# Patient Record
Sex: FEMALE | Race: BLACK OR AFRICAN AMERICAN | NOT HISPANIC OR LATINO | Employment: UNEMPLOYED | URBAN - METROPOLITAN AREA
[De-identification: names, ages, dates, MRNs, and addresses within clinical notes are randomized per-mention and may not be internally consistent; named-entity substitution may affect disease eponyms.]

---

## 2017-01-20 ENCOUNTER — OFFICE VISIT (OUTPATIENT)
Dept: PAIN MEDICINE | Facility: CLINIC | Age: 47
End: 2017-01-20

## 2017-01-20 VITALS
WEIGHT: 293 LBS | TEMPERATURE: 98.3 F | DIASTOLIC BLOOD PRESSURE: 83 MMHG | HEIGHT: 64 IN | OXYGEN SATURATION: 95 % | HEART RATE: 90 BPM | RESPIRATION RATE: 16 BRPM | BODY MASS INDEX: 50.02 KG/M2 | SYSTOLIC BLOOD PRESSURE: 138 MMHG

## 2017-01-20 DIAGNOSIS — M25.50 MULTIPLE JOINT PAIN: ICD-10-CM

## 2017-01-20 DIAGNOSIS — M48.061 LUMBAR SPINAL STENOSIS: Primary | ICD-10-CM

## 2017-01-20 PROCEDURE — 99213 OFFICE O/P EST LOW 20 MIN: CPT | Performed by: PAIN MEDICINE

## 2017-01-20 RX ORDER — HYDROCODONE BITARTRATE AND ACETAMINOPHEN 7.5; 325 MG/1; MG/1
1 TABLET ORAL 2 TIMES DAILY PRN
Qty: 60 TABLET | Refills: 0 | Status: SHIPPED | OUTPATIENT
Start: 2017-01-20 | End: 2017-02-20 | Stop reason: SDUPTHER

## 2017-01-20 NOTE — PROGRESS NOTES
CHIEF COMPLAINT: Back Pain; Extremity Pain; and Pain (right hip)    HPI  Angie Perera is a 46 y.o. female.  She is here to follow up for Back Pain; Extremity Pain; and Pain (right hip)    Since last visit their pain has remain unchanged. Multiple areas of pain, worse is right hip and lateral aspect of RLE just below right knee.  Pt c/o being constipated a lot but thinks it is due to eating less due to gastric bypass surgery she got last year. She said she is going at least three times a week now which is an improvement.     The patient states their pain is a 7 on a scale of 1-10.  The patient describes this pain as constant ache, stabbing and burning in R hip area and R leg. The pain is located in Low Back and radiates into right hip, lateral aspect of R leg to just below R knee. This painful problem is aggravated by lifting and standing,walking,sitting > 45 minutes and is alleviated by relaxation, pain medication, ice and heating pad. Intermittent numbness over right anterior thigh.   She said she stopped going to her chiropractor because he was not helping her and was costing too much. States her chiro told her there was nothing he could do.     On miralax nightly, fleet enema 2x/week, stool softener 3 days ago.  Was constipated before restarting narcotic medication and doesn't think that is the cause.   Has been constipated since 9/2016 when she had gastric bypass surgery.     Was evaluated by back surgeon approx 2 years ago at [facility]. Stated low back surgery was an option but wanted her to go through pain management first and she has been with [facility] ever since. She is hesitant to have back surgery. [facility], Dr. Duran, discharged from clinic for abnormal UDS with MJ, last visit 9/1/2016.    Past pain medications: hydrocodone 7.5/325 mg - tid - helping but became ineffective  Hydrocodone 10/325 mg tid- last dose was 9/2016  Prednisone dose pack- significant  help  Morphine ER 30 mg bid - made her sick and itching- stopped after her Bariatric surgery.   Tylenol q8h prn     Current pain medications:   Norco 5/325 mg bid prn pain   Gabapentin 800 mg qid - helping  Flexeril 10 mg tid - no help with pain but helps her sleep     Past therapies:  Physical Therapy: yes- currently going to water therapy three times/week.   Chiropractor: yes  Massage Therapy: no  TENS: yes  Neck or back surgery: no      Previous Injections: yes, total of 7 LESIs, most recently at [facility] in 2011  Effect of Injection (%): minimal help  Length of Relief: couple weeks     PEG Assessment   What number best describes your pain on average in the past week? 7  What number best describes how, during the past week, pain has interfered with your enjoyment of life? 7  What number best describes how, during the past week, pain has interfered with your general activity? 8      Current Outpatient Prescriptions:   •  allopurinol (ZYLOPRIM) 300 MG tablet, Take 300 mg by mouth every morning., Disp: , Rfl:   •  Calcium Carbonate-Vit D-Min (CALCIUM 1200 PO), Take 1,200 mg by mouth 2 (two) times a day., Disp: , Rfl:   •  Cholecalciferol (VITAMIN D3) 5000 UNITS capsule capsule, Take 5,000 Units by mouth Daily., Disp: , Rfl:   •  cyclobenzaprine (FLEXERIL) 10 MG tablet, Take 1 tablet by mouth 3 (Three) Times a Day As Needed for muscle spasms., Disp: 90 tablet, Rfl: 1  •  furosemide (LASIX) 40 MG tablet, Take 40 mg by mouth every morning., Disp: , Rfl:   •  gabapentin (NEURONTIN) 800 MG tablet, Take 1 tablet by mouth 4 (Four) Times a Day., Disp: 120 tablet, Rfl: 1  •  HYDROcodone-acetaminophen (NORCO) 5-325 MG per tablet, Take 1 tablet by mouth 2 (Two) Times a Day As Needed (pain)., Disp: 60 tablet, Rfl: 0  •  ursodiol (ACTIGALL) 300 MG capsule, Take 1 capsule by mouth 2 (two) times a day., Disp: 60 capsule, Rfl: 5  •  HYDROcodone-acetaminophen (NORCO) 7.5-325 MG per tablet, Take 1 tablet by mouth 2 (Two) Times  a Day As Needed (pain)., Disp: 60 tablet, Rfl: 0    IMAGING  lumbar MRI 11/26/2014:  Impression:  1. At L4/5, there is spondylolyses and listhesis which is mildly increased from the prior exam. Broad-based protruding disc is also increased from the prior exam and there is a dorsal right paracentral epidural fluid collection as described above that is probably represent a synovial or ligamentum flavum cyst. The combination of finding results in relatively severe spinal stenosis which is increased from the prior exam as well as severe left and moderately severe right foraminal stenosis and impingement of the L4 nerve root bilaterally.  2. At L5-S1 there is a 2-3 mm central and right paracentral disc bulge which appears stable.  This lumbar MRI report was completed at [facility] and will be scanned into her permanent record.    The following portions of the patient's history were reviewed and updated as appropriate: allergies, current medications, past family history, past medical history, past social history, past surgical history and problem list.    Review of Systems   Constitutional: Positive for appetite change (decreased). Negative for activity change, chills and fever.   HENT: Negative for ear discharge and ear pain.    Eyes: Negative for pain.   Respiratory: Negative for apnea, cough, shortness of breath and wheezing.    Cardiovascular: Negative for chest pain and palpitations.   Gastrointestinal: Positive for constipation. Negative for abdominal pain, diarrhea, nausea and vomiting.   Genitourinary: Negative for difficulty urinating.   Musculoskeletal: Positive for back pain.   Neurological: Positive for numbness. Negative for dizziness, weakness, light-headedness and headaches.   Psychiatric/Behavioral: Positive for sleep disturbance (pt said she only sleeps 2-3 hours.). Negative for agitation, confusion, hallucinations and suicidal ideas. The patient is not nervous/anxious.    All other systems  "reviewed and are negative.      Vitals:    01/20/17 0848   BP: 138/83   Pulse: 90   Resp: 16   Temp: 98.3 °F (36.8 °C)   SpO2: 95%   Weight: (!) 333 lb 12.8 oz (151 kg)   Height: 64\" (162.6 cm)   PainSc: 7  Comment: right hip and right leg   PainLoc: Back       Physical Exam   Constitutional: She is oriented to person, place, and time. She appears well-developed and well-nourished. No distress.   HENT:   Head: Normocephalic and atraumatic.   Nose: Nose normal.   Mouth/Throat: Oropharynx is clear and moist.   Eyes: Conjunctivae and EOM are normal.   Neck: Normal range of motion. Neck supple.   Pulmonary/Chest: Effort normal. No stridor. No respiratory distress.   Abdominal: Soft. There is no tenderness. There is no rebound and no guarding.   Musculoskeletal:        Right hip: She exhibits decreased range of motion (due to size) and decreased strength. She exhibits no tenderness and no swelling.        Left hip: She exhibits decreased range of motion (due to size). She exhibits normal strength and no tenderness.        Lumbar back: She exhibits decreased range of motion (due to size). She exhibits no tenderness, no bony tenderness and no pain.   Neurological: She is alert and oriented to person, place, and time. No cranial nerve deficit or sensory deficit.   Skin: Skin is warm and dry. No rash noted. She is not diaphoretic.   Psychiatric: She has a normal mood and affect. Her speech is normal and behavior is normal.   Nursing note and vitals reviewed.    Ortho Exam  Neurologic Exam     Mental Status   Oriented to person, place, and time.   Speech: speech is normal     Cranial Nerves     CN III, IV, VI   Extraocular motions are normal.     Gait, Coordination, and Reflexes     Gait  Gait: non-neurologic and shuffling      Pain Management Panel     Pain Management Panel Latest Ref Rng 11/15/2016    AMPHETAMINES SCREEN, URINE Negative Negative    BARBITURATES SCREEN Negative Negative    BENZODIAZEPINE SCREEN, URINE " Negative Negative    COCAINE SCREEN, URINE Negative Negative    METHADONE SCREEN, URINE Negative Negative        Last UDS: 11/15/2016  Comments: +gabapentin only - Consistent        Date of last ASAEL reviewed : 01/20/17   Comments: Consistent       Assessment/Plan   Angie was seen today for back pain, extremity pain and pain.    Diagnoses and all orders for this visit:    Lumbar spinal stenosis  -     HYDROcodone-acetaminophen (NORCO) 7.5-325 MG per tablet; Take 1 tablet by mouth 2 (Two) Times a Day As Needed (pain).    Multiple joint pain  -     HYDROcodone-acetaminophen (NORCO) 7.5-325 MG per tablet; Take 1 tablet by mouth 2 (Two) Times a Day As Needed (pain).    Requested Prescriptions     Signed Prescriptions Disp Refills   • HYDROcodone-acetaminophen (NORCO) 7.5-325 MG per tablet 60 tablet 0     Sig: Take 1 tablet by mouth 2 (Two) Times a Day As Needed (pain).     - New patient UDS was consistent.   - INCREASE norco from 5 to 7.5 mg bid prn for pain. (#30, 0 refills)   - Given history of discharge for abnormal UDS - will monitor closely.   - Random urine drug screen per office policy AT NEXT VISIT.   - Given her last narcotic Rx was over 3 months ago, she has had an opioid break which should lower to tolerance to the medication. A lower dose should be more effective now. Will not continue her high dose she was previously on.   - Even though I am increasing her strength of the hydrocodone from 5-7.5 mg 2 times a day this is still significantly lower than her previous narcotic dose of hydrocodone 10 mg 3 times a day.  Do not plan to increase it any further after today.  Do not wish to place the patient on high dose narcotics for the rest of her life as it is been shown to have no long-term benefit.  Patient is to continue to lose weight and along with her multimodal regimen hopes to wean off his narcotic medication in the future.    - Continue Gabapentin at current dose of 800 mg qid as it is currently  helping. No refills needed today.   - Continue flexeril 10 mg tid prn as it is currently helping. No refills needed today.   - Given poor response to injections in the past will not plan on repeating epidurals at this time.   - Continue bowel regimen for chronic constipation.  - If she continues to have a poor response to low dose narcotics she will need reevaluation by a spine surgeon.   - States she will consider spine surgery after weight loss or if pain/numbness/weakness worsens given lumbar spinal stenosis seen on MRI.  We will hold off for now.    Wt Readings from Last 3 Encounters:   01/20/17 (!) 333 lb 12.8 oz (151 kg)   12/22/16 (!) 338 lb 3.2 oz (153 kg)   11/16/16 (!) 350 lb (159 kg)     Body mass index is 57.3 kg/(m^2). 421 on 8/2016, lost #100 over last 6 months. Lost #5 over last month.  Goal weight of #150. Plan: Calorie countingTries to eat 6 times/day, increase protein. reduce screen time, reduce portion size and cut out extra servings    Follow-up in 1 months.    ASAEL REPORT  As part of the patient's treatment plan, I am prescribing controlled substances. The patient has been made aware of appropriate use of such medications, including potential risk of somnolence, limited ability to drive and/or work safely, and the potential for dependence or overdose. It has also bee made clear that these medications are for use by this patient only, without concomitant use of alcohol or other substances unless prescribed.     Patient has completed prescribing agreement detailing terms of continued prescribing of controlled substances, including monitoring ASAEL reports, urine drug screening, and pill counts if necessary. The patient is aware that inappropriate use will results in cessation of prescribing such medications.    ASAEL report has been reviewed and scanned into the patient's chart.    History and physical exam exhibit continued safe and appropriate use of controlled substances.     Shanda Reddy  MD [facility]

## 2017-02-01 DIAGNOSIS — M25.50 MULTIPLE JOINT PAIN: ICD-10-CM

## 2017-02-01 RX ORDER — GABAPENTIN 800 MG/1
TABLET ORAL
Qty: 120 TABLET | Refills: 2 | Status: SHIPPED | OUTPATIENT
Start: 2017-02-01 | End: 2017-05-01 | Stop reason: SDUPTHER

## 2017-02-08 ENCOUNTER — APPOINTMENT (OUTPATIENT)
Dept: LAB | Facility: HOSPITAL | Age: 47
End: 2017-02-08

## 2017-02-08 PROCEDURE — 82728 ASSAY OF FERRITIN: CPT | Performed by: NURSE PRACTITIONER

## 2017-02-08 PROCEDURE — 83036 HEMOGLOBIN GLYCOSYLATED A1C: CPT | Performed by: NURSE PRACTITIONER

## 2017-02-08 PROCEDURE — 80053 COMPREHEN METABOLIC PANEL: CPT | Performed by: NURSE PRACTITIONER

## 2017-02-08 PROCEDURE — 82746 ASSAY OF FOLIC ACID SERUM: CPT | Performed by: NURSE PRACTITIONER

## 2017-02-08 PROCEDURE — 84425 ASSAY OF VITAMIN B-1: CPT | Performed by: NURSE PRACTITIONER

## 2017-02-08 PROCEDURE — 36415 COLL VENOUS BLD VENIPUNCTURE: CPT | Performed by: NURSE PRACTITIONER

## 2017-02-08 PROCEDURE — 83921 ORGANIC ACID SINGLE QUANT: CPT | Performed by: NURSE PRACTITIONER

## 2017-02-08 PROCEDURE — 85025 COMPLETE CBC W/AUTO DIFF WBC: CPT | Performed by: NURSE PRACTITIONER

## 2017-02-15 ENCOUNTER — OFFICE VISIT (OUTPATIENT)
Dept: BARIATRICS/WEIGHT MGMT | Facility: CLINIC | Age: 47
End: 2017-02-15

## 2017-02-15 VITALS
WEIGHT: 293 LBS | DIASTOLIC BLOOD PRESSURE: 103 MMHG | BODY MASS INDEX: 50.02 KG/M2 | RESPIRATION RATE: 16 BRPM | SYSTOLIC BLOOD PRESSURE: 163 MMHG | HEART RATE: 78 BPM | TEMPERATURE: 97.8 F | HEIGHT: 64 IN

## 2017-02-15 DIAGNOSIS — Z71.3 DIETARY COUNSELING: ICD-10-CM

## 2017-02-15 DIAGNOSIS — K59.00 CONSTIPATION, UNSPECIFIED CONSTIPATION TYPE: ICD-10-CM

## 2017-02-15 DIAGNOSIS — R60.0 LOCALIZED EDEMA: ICD-10-CM

## 2017-02-15 DIAGNOSIS — M25.50 MULTIPLE JOINT PAIN: ICD-10-CM

## 2017-02-15 DIAGNOSIS — E66.01 MORBID OBESITY WITH BMI OF 50.0-59.9, ADULT (HCC): Primary | ICD-10-CM

## 2017-02-15 DIAGNOSIS — I10 ESSENTIAL HYPERTENSION: ICD-10-CM

## 2017-02-15 DIAGNOSIS — R73.03 PREDIABETES: ICD-10-CM

## 2017-02-15 PROCEDURE — 99213 OFFICE O/P EST LOW 20 MIN: CPT | Performed by: NURSE PRACTITIONER

## 2017-02-15 NOTE — PROGRESS NOTES
[facility]  [address]  [facility] [address]  [address]  [facility] [address]  Dept: [phone]  2/15/2017      Angie Perera.  [id number]  [id number]  1970  female      Chief Complaint   Patient presents with   • Follow-up     s/p gastric sleeve c/o constipation       [facility] Post-Op Bariatric Surgery:   Angie Perera is status post laparopscopic Sleeve procedure, performed on 08/15/16    HPI:   Today's weight is (!) 321 lb (146 kg) pounds, today's BMI is Body mass index is 55.1 kg/(m^2)., she has a  loss of 29 pounds since the last visit and her weight loss since surgery is 94 pounds. The patient reports a decreased portion size and loss of appetite.      Angie Perera denies nausea, vomiting, dysphagia, abdominal pain or heartburn. Patient c/o constipation that has improved with stool softener.      Diet and Exercise: Diet history reviewed and discussed with the patient. Weight loss/gains to date discussed with the patient. The patient states they are eating  grams of protein per day. She reports eating 3 meals per day, a typical portion size of 1/2 cup, eating 2 snacks per day, drinking 5+ or more 8-oz. glasses of water per day, no carbonated beverage consumption and exercising regularly- water exercise.     Supplements: Nature made MVI, iron, calcium, d3, b12/bcomplex, vitamin c and biotin.     Review of Systems   Gastrointestinal: Positive for constipation.   All other systems reviewed and are negative.      Patient Active Problem List   Diagnosis   • Essential hypertension   • Localized edema   • Snoring   • Multiple joint pain   • History of Bell's palsy   • Depression   • Prediabetes   • Chronic gout   • Dietary counseling   • Constipation   • Lumbar spinal stenosis   • Morbid obesity with BMI of 50.0-59.9, adult       The following portions of the patient's history were reviewed and updated as  appropriate: allergies, current medications, past family history, past medical history, past social history, past surgical history and problem list.    Vitals:    02/15/17 0935   BP: (!) 163/103   Pulse: 78   Resp: 16   Temp: 97.8 °F (36.6 °C)       Physical Exam   Constitutional: She is oriented to person, place, and time. She appears well-developed and well-nourished.   HENT:   Head: Normocephalic and atraumatic.   Eyes: EOM are normal.   Cardiovascular: Normal rate, regular rhythm and normal heart sounds.    Pulmonary/Chest: Effort normal and breath sounds normal.   Abdominal: Soft. Bowel sounds are normal. She exhibits no distension. There is no tenderness.   Musculoskeletal: Normal range of motion.   Neurological: She is alert and oriented to person, place, and time.   Skin: Skin is warm and dry.   Psychiatric: She has a normal mood and affect. Her behavior is normal. Judgment and thought content normal.   Vitals reviewed.        Assessment:   Post-op, the patient is doing well.     Plan:     Encouraged patient to continue with plenty of lean protein through small frequent meals. Continue with water, vitamins and exercise. Reviewed appropriate weight loss/goals. Discussed skin removal and a time frame for that.  Patient will check her blood pressure at home later and if still elevated going to check in with her PCP.  Activity restrictions: none.   Recommended patient be sure to get at least 70 grams of protein per day by eating small, frequent meals all with high lean protein choices. Be sure to limit/cut back on daily carbohydrate intake. Discussed with the patient the recommended amount of water per day to intake- half of body weight in ounces. Reviewed vitamin requirements. Be sure to do routine exercise, 150 minutes per week minimum, including both cardio and strength training.     Instructions / Recommendations: dietary counseling recommended, recommended a daily protein intake of  grams, vitamin  supplement(s) recommended, recommended exercising at least 150 minutes per week, behavior modifications recommended and instructed to call the office for concerns, questions, or problems.     The patient was instructed to follow up in 3 months.     The patient was counseled regarding. Total time spent face to face was 15 minutes and more than half the time was spent counseling.

## 2017-02-20 ENCOUNTER — OFFICE VISIT (OUTPATIENT)
Dept: PAIN MEDICINE | Facility: CLINIC | Age: 47
End: 2017-02-20

## 2017-02-20 VITALS
TEMPERATURE: 98.1 F | OXYGEN SATURATION: 97 % | SYSTOLIC BLOOD PRESSURE: 155 MMHG | HEART RATE: 89 BPM | RESPIRATION RATE: 16 BRPM | BODY MASS INDEX: 50.02 KG/M2 | HEIGHT: 64 IN | WEIGHT: 293 LBS | DIASTOLIC BLOOD PRESSURE: 99 MMHG

## 2017-02-20 DIAGNOSIS — M25.50 MULTIPLE JOINT PAIN: ICD-10-CM

## 2017-02-20 DIAGNOSIS — M48.061 LUMBAR SPINAL STENOSIS: ICD-10-CM

## 2017-02-20 LAB
POC AMPHETAMINES: NEGATIVE
POC BARBITURATES: NEGATIVE
POC BENZODIAZEPHINES: NEGATIVE
POC COCAINE: NEGATIVE
POC METHADONE: NEGATIVE
POC METHAMPHETAMINE SCREEN URINE: NEGATIVE
POC OPIATES: POSITIVE
POC OXYCODONE: NEGATIVE
POC PHENCYCLIDINE: NEGATIVE
POC PROPOXYPHENE: NEGATIVE
POC THC: NEGATIVE
POC TRICYCLIC ANTIDEPRESSANTS: POSITIVE

## 2017-02-20 PROCEDURE — 99213 OFFICE O/P EST LOW 20 MIN: CPT | Performed by: PAIN MEDICINE

## 2017-02-20 PROCEDURE — 80305 DRUG TEST PRSMV DIR OPT OBS: CPT | Performed by: PAIN MEDICINE

## 2017-02-20 RX ORDER — HYDROCODONE BITARTRATE AND ACETAMINOPHEN 7.5; 325 MG/1; MG/1
1 TABLET ORAL 2 TIMES DAILY PRN
Qty: 60 TABLET | Refills: 0 | Status: SHIPPED | OUTPATIENT
Start: 2017-02-20 | End: 2017-06-08

## 2017-02-20 RX ORDER — HYDROCODONE BITARTRATE AND ACETAMINOPHEN 7.5; 325 MG/1; MG/1
1 TABLET ORAL 2 TIMES DAILY PRN
Qty: 60 TABLET | Refills: 0 | Status: SHIPPED | OUTPATIENT
Start: 2017-02-20 | End: 2017-05-22 | Stop reason: SDUPTHER

## 2017-02-20 RX ORDER — HYDROCODONE BITARTRATE AND ACETAMINOPHEN 7.5; 325 MG/1; MG/1
1 TABLET ORAL 2 TIMES DAILY PRN
Qty: 60 TABLET | Refills: 0 | Status: SHIPPED | OUTPATIENT
Start: 2017-02-20 | End: 2017-05-02 | Stop reason: SDUPTHER

## 2017-02-20 RX ORDER — CYCLOBENZAPRINE HCL 10 MG
10 TABLET ORAL 3 TIMES DAILY PRN
Qty: 90 TABLET | Refills: 2 | Status: SHIPPED | OUTPATIENT
Start: 2017-02-20 | End: 2017-06-05 | Stop reason: SDUPTHER

## 2017-02-20 NOTE — PROGRESS NOTES
"CHIEF COMPLAINT: Back Pain and Extremity Pain    HPI  Angie Perera is a 46 y.o. female.  She is here to follow up for Back Pain and Extremity Pain    Since last visit their pain has improved. Increased pain medication last month which is helping. Pt said last visit her pain was around a 7/10, now it is a 5/10. Thinks recent weight loss may be helping as well.  Has been on a set schedule with her medication regimen which is helping control her pain better.  Has lost #16 over last 1 month - goes to [employer] daily, aquatic therapy, piliates.     The patient states their pain is a 5 on a scale of 1-10.  The patient describes this pain as constant ache, stabbing and burning in R hip area and R leg. The pain is located in Low Back and radiates into right hip, lateral aspect of R leg to just below R knee. This painful problem is aggravated by lifting and standing,walking,sitting > 45 minutes and is alleviated by relaxation, pain medication, ice and heating pad. Intermittent numbness over right anterior thigh.     Saw ortho Dr. Ferreira last month for right knee pain. Took xrays, stated she had arthritis and may need replacement in future but will wait until she looses more weight. Has follow up next week.     Has been taking stool softener bid which is helping significantly. Miralax qhs. Currently controlled.    Has Bell's Palsey \"flare ups\" and takes when it flares ups.  Took 1 tablet 4 days ago, prednisone 20 mg - last dose before this was 7 months ago.     Past pain medications: hydrocodone 7.5/325 mg - tid - helping but became ineffective  Hydrocodone 10/325 mg tid- last dose was 9/2016  Prednisone dose pack- significant help  Morphine ER 30 mg bid - made her sick and itching- stopped after her Bariatric surgery.   Tylenol q8h prn      Current pain medications:   Norco 7.5/325 mg bid prn pain   Gabapentin 800 mg qid - helping  Flexeril 10 mg bid - no help with pain but helps her sleep      Past therapies:  Physical " Therapy: yes- currently going to water therapy three times/week.   Chiropractor: yes  Massage Therapy: no  TENS: yes  Neck or back surgery: no      Previous Injection: right knee injection - 1/16/2017  Effect of Injection (%): minimal help    Previous Injections: yes, total of 7 LESIs, most recently at [facility] in 2011  Effect of Injection (%): minimal help  Length of Relief: couple weeks     PEG Assessment   What number best describes your pain on average in the past week? 5  What number best describes how, during the past week, pain has interfered with your enjoyment of life? 5  What number best describes how, during the past week, pain has interfered with your general activity? 5      Current Outpatient Prescriptions:   •  allopurinol (ZYLOPRIM) 300 MG tablet, Take 300 mg by mouth every morning., Disp: , Rfl:   •  Calcium Carbonate-Vit D-Min (CALCIUM 1200 PO), Take 1,200 mg by mouth 2 (two) times a day., Disp: , Rfl:   •  Cholecalciferol (VITAMIN D3) 5000 UNITS capsule capsule, Take 5,000 Units by mouth Daily., Disp: , Rfl:   •  cyclobenzaprine (FLEXERIL) 10 MG tablet, Take 1 tablet by mouth 3 (Three) Times a Day As Needed for muscle spasms., Disp: 90 tablet, Rfl: 2  •  furosemide (LASIX) 40 MG tablet, Take 40 mg by mouth every morning., Disp: , Rfl:   •  gabapentin (NEURONTIN) 800 MG tablet, TAKE 1 TABLET BY MOUTH FOUR TIMES DAILY, Disp: 120 tablet, Rfl: 2  •  HYDROcodone-acetaminophen (NORCO) 7.5-325 MG per tablet, Take 1 tablet by mouth 2 (Two) Times a Day As Needed (pain)., Disp: 60 tablet, Rfl: 0  •  ursodiol (ACTIGALL) 300 MG capsule, Take 1 capsule by mouth 2 (two) times a day., Disp: 60 capsule, Rfl: 5  •  HYDROcodone-acetaminophen (NORCO) 7.5-325 MG per tablet, Take 1 tablet by mouth 2 (Two) Times a Day As Needed (pain)., Disp: 60 tablet, Rfl: 0  •  HYDROcodone-acetaminophen (NORCO) 7.5-325 MG per tablet, Take 1 tablet by mouth 2 (Two) Times a Day As Needed (pain)., Disp: 60 tablet, Rfl:  0    IMAGING  lumbar MRI 11/26/2014:  Impression:  1. At L4/5, there is spondylolyses and listhesis which is mildly increased from the prior exam. Broad-based protruding disc is also increased from the prior exam and there is a dorsal right paracentral epidural fluid collection as described above that is probably represent a synovial or ligamentum flavum cyst. The combination of finding results in relatively severe spinal stenosis which is increased from the prior exam as well as severe left and moderately severe right foraminal stenosis and impingement of the L4 nerve root bilaterally.  2. At L5-S1 there is a 2-3 mm central and right paracentral disc bulge which appears stable.  This lumbar MRI report was completed at [facility] and will be scanned into her permanent record.    PFSH:  The following portions of the patient's history were reviewed and updated as appropriate: problem list, past medical history, past surgery history, social history, family history, medications, and allergies    Review of Systems   Constitutional: Positive for appetite change (decreased). Negative for activity change, chills and fever.   HENT: Negative for ear discharge and ear pain.    Eyes: Negative for pain.   Respiratory: Negative for apnea, cough, shortness of breath and wheezing.    Cardiovascular: Negative for chest pain and palpitations.   Gastrointestinal: Positive for constipation (pt said it has gotten better since she increased her stool softener and has been drinking more water). Negative for abdominal pain, diarrhea, nausea and vomiting.   Genitourinary: Negative for difficulty urinating.   Musculoskeletal: Positive for back pain.   Neurological: Positive for numbness (down leg). Negative for dizziness, weakness, light-headedness and headaches.   Psychiatric/Behavioral: Positive for sleep disturbance (pt said she only sleeps 2-3 hours.). Negative for agitation, confusion, hallucinations and suicidal ideas. The  "patient is not nervous/anxious.    All other systems reviewed and are negative.      Vitals:    02/20/17 1308   BP: 155/99   Pulse: 89   Resp: 16   Temp: 98.1 °F (36.7 °C)   SpO2: 97%   Weight: (!) 317 lb (144 kg)   Height: 64\" (162.6 cm)   PainSc:   5   PainLoc: Back       Physical Exam   Constitutional: She is oriented to person, place, and time. She appears well-developed and well-nourished. No distress.   HENT:   Head: Normocephalic and atraumatic.   Nose: Nose normal.   Mouth/Throat: Oropharynx is clear and moist.   Eyes: Conjunctivae and EOM are normal.   Neck: Normal range of motion. Neck supple.   Pulmonary/Chest: Effort normal. No stridor. No respiratory distress.   Abdominal: Soft. There is no tenderness. There is no rebound and no guarding.   Musculoskeletal:        Right hip: She exhibits decreased range of motion (due to size) and decreased strength. She exhibits no tenderness and no swelling.        Left hip: She exhibits decreased range of motion (due to size). She exhibits normal strength and no tenderness.        Lumbar back: She exhibits decreased range of motion (due to size). She exhibits no tenderness, no bony tenderness and no pain.   Neurological: She is alert and oriented to person, place, and time. No cranial nerve deficit or sensory deficit.   Skin: Skin is warm and dry. No rash noted. She is not diaphoretic.   Psychiatric: She has a normal mood and affect. Her speech is normal and behavior is normal.   Nursing note and vitals reviewed.    Ortho Exam  Neurologic Exam     Mental Status   Oriented to person, place, and time.   Speech: speech is normal     Cranial Nerves     CN III, IV, VI   Extraocular motions are normal.       Pain Management Panel     Pain Management Panel Latest Ref Rng 11/15/2016    AMPHETAMINES SCREEN, URINE Negative Negative    BARBITURATES SCREEN Negative Negative    BENZODIAZEPINE SCREEN, URINE Negative Negative    COCAINE SCREEN, URINE Negative Negative    METHADONE " SCREEN, URINE Negative Negative        Last UDS results reviewed 02/20/17   Last UDS: 11/15/2016  Comments: +gabapentin only - Consistent       Date of last ASAEL reviewed : 02/20/17   Comments: Consistent     Assessment/Plan   Angie was seen today for back pain and extremity pain.    Diagnoses and all orders for this visit:    Multiple joint pain  -     HYDROcodone-acetaminophen (NORCO) 7.5-325 MG per tablet; Take 1 tablet by mouth 2 (Two) Times a Day As Needed (pain).  -     cyclobenzaprine (FLEXERIL) 10 MG tablet; Take 1 tablet by mouth 3 (Three) Times a Day As Needed for muscle spasms.    Lumbar spinal stenosis  -     HYDROcodone-acetaminophen (NORCO) 7.5-325 MG per tablet; Take 1 tablet by mouth 2 (Two) Times a Day As Needed (pain).    Other orders  -     HYDROcodone-acetaminophen (NORCO) 7.5-325 MG per tablet; Take 1 tablet by mouth 2 (Two) Times a Day As Needed (pain).  -     HYDROcodone-acetaminophen (NORCO) 7.5-325 MG per tablet; Take 1 tablet by mouth 2 (Two) Times a Day As Needed (pain).    Requested Prescriptions     Signed Prescriptions Disp Refills   • HYDROcodone-acetaminophen (NORCO) 7.5-325 MG per tablet 60 tablet 0     Sig: Take 1 tablet by mouth 2 (Two) Times a Day As Needed (pain).   • HYDROcodone-acetaminophen (NORCO) 7.5-325 MG per tablet 60 tablet 0     Sig: Take 1 tablet by mouth 2 (Two) Times a Day As Needed (pain).   • HYDROcodone-acetaminophen (NORCO) 7.5-325 MG per tablet 60 tablet 0     Sig: Take 1 tablet by mouth 2 (Two) Times a Day As Needed (pain).   • cyclobenzaprine (FLEXERIL) 10 MG tablet 90 tablet 2     Sig: Take 1 tablet by mouth 3 (Three) Times a Day As Needed for muscle spasms.     - UDS done today, will check on next visit.   - New patient UDS was consistent.   - CONTINUE norco at 7.5 mg bid prn for pain. (#60, 2 refills)   - Three months prescriptions given today. Appropriate DNF dates applied.    - Given history of discharge for abnormal UDS - will monitor closely.   - She  is on a significantly lower dose of narcotic medication than she was approx 1 year ago when she was on morphine bid as well as norco tid.  Will continue her on her lower dose as it is effective and will not increase her back up to her high dose she was previously on.   - Patient is to continue to lose weight and along with her multimodal regimen hopes to wean off his narcotic medication in the future.  - Continue Gabapentin at current dose of 800 mg qid as it is currently helping. No refills needed today.   - Continue flexeril 10 mg tid prn as it is currently helping. (#60, 2 refills)   - Given poor response to injections in the past will not plan on repeating epidurals at this time.   - Continue bowel regimen for chronic constipation.  - If she continues to have a poor response to low dose narcotics she will need reevaluation by a spine surgeon.   - States she will consider spine surgery after weight loss or if pain/numbness/weakness worsens given lumbar spinal stenosis seen on MRI.  We will hold off for now.       Wt Readings from Last 3 Encounters:   02/20/17 (!) 317 lb (144 kg)   02/15/17 (!) 321 lb (146 kg)   01/20/17 (!) 333 lb 12.8 oz (151 kg)     Body mass index is 54.41 kg/(m^2). By CDC definitions, this patient is obese (BMI >= 30). Patient counseled on the importance of weight loss to help with overall health and pain control. Patient instructed to attempt weight loss.   Plan: Calorie counting  increase physical activity, reduce portion size, cut out extra servings and reduce fast food intake continue to attend [facility] daily, continue aquatic therapy    Follow-up in 3 months.    ASAEL REPORT  As part of the patient's treatment plan, I am prescribing controlled substances. The patient has been made aware of appropriate use of such medications, including potential risk of somnolence, limited ability to drive and/or work safely, and the potential for dependence or overdose. It has also bee made clear that  these medications are for use by this patient only, without concomitant use of alcohol or other substances unless prescribed.     Patient has completed prescribing agreement detailing terms of continued prescribing of controlled substances, including monitoring ASAEL reports, urine drug screening, and pill counts if necessary. The patient is aware that inappropriate use will results in cessation of prescribing such medications.    ASAEL report has been reviewed and scanned into the patient's chart.    History and physical exam exhibit continued safe and appropriate use of controlled substances.       Shanda Dye MD  Pain Management

## 2017-03-07 DIAGNOSIS — M25.50 MULTIPLE JOINT PAIN: ICD-10-CM

## 2017-03-07 RX ORDER — CYCLOBENZAPRINE HCL 10 MG
TABLET ORAL
Qty: 90 TABLET | Refills: 0 | OUTPATIENT
Start: 2017-03-07

## 2017-05-01 DIAGNOSIS — M25.50 MULTIPLE JOINT PAIN: ICD-10-CM

## 2017-05-02 RX ORDER — HYDROCODONE BITARTRATE AND ACETAMINOPHEN 7.5; 325 MG/1; MG/1
1 TABLET ORAL 2 TIMES DAILY PRN
Qty: 60 TABLET | Refills: 0 | Status: SHIPPED | OUTPATIENT
Start: 2017-05-02 | End: 2017-05-22 | Stop reason: SDUPTHER

## 2017-05-02 RX ORDER — GABAPENTIN 800 MG/1
TABLET ORAL
Qty: 120 TABLET | Refills: 1 | Status: SHIPPED | OUTPATIENT
Start: 2017-05-02 | End: 2017-07-07 | Stop reason: SDUPTHER

## 2017-05-22 ENCOUNTER — OFFICE VISIT (OUTPATIENT)
Dept: PAIN MEDICINE | Facility: CLINIC | Age: 47
End: 2017-05-22

## 2017-05-22 VITALS
RESPIRATION RATE: 16 BRPM | BODY MASS INDEX: 48.32 KG/M2 | TEMPERATURE: 98.2 F | WEIGHT: 283 LBS | OXYGEN SATURATION: 97 % | DIASTOLIC BLOOD PRESSURE: 106 MMHG | SYSTOLIC BLOOD PRESSURE: 155 MMHG | HEART RATE: 78 BPM | HEIGHT: 64 IN

## 2017-05-22 DIAGNOSIS — M48.061 LUMBAR SPINAL STENOSIS: Primary | ICD-10-CM

## 2017-05-22 PROCEDURE — 99213 OFFICE O/P EST LOW 20 MIN: CPT | Performed by: PAIN MEDICINE

## 2017-05-22 RX ORDER — FLUTICASONE PROPIONATE 50 MCG
SPRAY, SUSPENSION (ML) NASAL
Refills: 11 | COMMUNITY
Start: 2017-05-01

## 2017-05-22 RX ORDER — HYDROCODONE BITARTRATE AND ACETAMINOPHEN 7.5; 325 MG/1; MG/1
1 TABLET ORAL 2 TIMES DAILY PRN
Qty: 60 TABLET | Refills: 0 | Status: SHIPPED | OUTPATIENT
Start: 2017-05-22 | End: 2017-07-24 | Stop reason: SDUPTHER

## 2017-05-22 RX ORDER — CARVEDILOL 12.5 MG/1
TABLET ORAL
Refills: 5 | COMMUNITY
Start: 2017-05-08

## 2017-05-22 RX ORDER — ALBUTEROL SULFATE 90 UG/1
AEROSOL, METERED RESPIRATORY (INHALATION)
Refills: 2 | COMMUNITY
Start: 2017-05-08

## 2017-05-22 RX ORDER — HYDROCODONE BITARTRATE AND ACETAMINOPHEN 7.5; 325 MG/1; MG/1
1 TABLET ORAL 2 TIMES DAILY PRN
Qty: 60 TABLET | Refills: 0 | Status: SHIPPED | OUTPATIENT
Start: 2017-05-22 | End: 2017-06-08

## 2017-05-22 RX ORDER — AMLODIPINE BESYLATE 5 MG/1
TABLET ORAL
Refills: 1 | COMMUNITY
Start: 2017-05-08

## 2017-06-05 DIAGNOSIS — M25.50 MULTIPLE JOINT PAIN: ICD-10-CM

## 2017-06-05 RX ORDER — CYCLOBENZAPRINE HCL 10 MG
TABLET ORAL
Qty: 90 TABLET | Refills: 1 | Status: SHIPPED | OUTPATIENT
Start: 2017-06-05 | End: 2017-08-04 | Stop reason: SDUPTHER

## 2017-06-08 ENCOUNTER — OFFICE VISIT (OUTPATIENT)
Dept: BARIATRICS/WEIGHT MGMT | Facility: CLINIC | Age: 47
End: 2017-06-08

## 2017-06-08 VITALS
HEIGHT: 64 IN | HEART RATE: 69 BPM | WEIGHT: 293 LBS | DIASTOLIC BLOOD PRESSURE: 89 MMHG | TEMPERATURE: 98.1 F | SYSTOLIC BLOOD PRESSURE: 134 MMHG | RESPIRATION RATE: 16 BRPM | BODY MASS INDEX: 50.02 KG/M2

## 2017-06-08 DIAGNOSIS — Z71.3 DIETARY COUNSELING: ICD-10-CM

## 2017-06-08 DIAGNOSIS — E66.01 OBESITY, CLASS III, BMI 40-49.9 (MORBID OBESITY) (HCC): Primary | ICD-10-CM

## 2017-06-08 DIAGNOSIS — I10 ESSENTIAL HYPERTENSION: ICD-10-CM

## 2017-06-08 DIAGNOSIS — F32.A DEPRESSION, UNSPECIFIED DEPRESSION TYPE: ICD-10-CM

## 2017-06-08 DIAGNOSIS — M25.50 MULTIPLE JOINT PAIN: ICD-10-CM

## 2017-06-08 PROCEDURE — 99213 OFFICE O/P EST LOW 20 MIN: CPT | Performed by: NURSE PRACTITIONER

## 2017-06-08 NOTE — PROGRESS NOTES
[facility]RIC SURGERY  [address]  [facility] [id number] [phone]  [address]  [facility] [id number] [phone]  Dept: [phone]  6/8/2017      Angie Perera.  [id number]  [id number]  1970  female      Chief Complaint   Patient presents with   • Follow-up     s/p gastric sleeve       BH Post-Op Bariatric Surgery:   Angie Perera is status post laparopscopic Sleeve procedure, performed on 8/15/16    HPI:   Today's weight is   pounds, today's BMI is There is no height or weight on file to calculate BMI., she has a  loss of 23 pounds since the last visit and her weight loss since surgery is 117 pounds. The patient reports a decreased portion size and loss of appetite.      Angie Perera denies nausea, vomiting, dysphagia, abdominal pain or heartburn.     Diet and Exercise: Diet history reviewed and discussed with the patient. Weight loss/gains to date discussed with the patient. The patient states they are eating  grams of protein per day. She reports eating 3 meals per day, a typical portion size of 1 cup, eating 2 snacks per day, drinking 6 or more 8-oz. glasses of water per day, no carbonated beverage consumption and exercising regularly- swimming, weight training, walking 3-4 days per week.    Supplements: b12, calcium citrate, MTV, vitamin C and vitamin D     Review of Systems   Constitutional: Positive for appetite change. Negative for fatigue and unexpected weight change.   HENT: Negative.    Eyes: Negative.    Respiratory: Negative.    Cardiovascular: Negative.  Negative for leg swelling.   Gastrointestinal: Negative for abdominal distention, abdominal pain, constipation, diarrhea, nausea and vomiting.   Genitourinary: Negative for difficulty urinating, frequency and urgency.   Musculoskeletal: Negative for back pain.   Skin: Negative.    Psychiatric/Behavioral: Negative.    All other systems reviewed  and are negative.      Patient Active Problem List   Diagnosis   • Essential hypertension   • Localized edema   • Snoring   • Multiple joint pain   • History of Bell's palsy   • Depression   • Prediabetes   • Chronic gout   • Dietary counseling   • Constipation   • Lumbar spinal stenosis   • Obesity, Class III, BMI 40-49.9 (morbid obesity)       The following portions of the patient's history were reviewed and updated as appropriate: allergies, current medications, past family history, past medical history, past social history, past surgical history and problem list.    There were no vitals filed for this visit.    Physical Exam   Constitutional: She appears well-developed and well-nourished.   Neck: No thyromegaly present.   Cardiovascular: Normal rate, regular rhythm and normal heart sounds.    Pulmonary/Chest: Effort normal and breath sounds normal. No respiratory distress. She has no wheezes.   Abdominal: Soft. Bowel sounds are normal. She exhibits no distension. There is no tenderness. There is no guarding. No hernia.   Musculoskeletal: She exhibits no edema or tenderness.   Neurological: She is alert.   Skin: Skin is warm and dry. No rash noted. No erythema.   Psychiatric: She has a normal mood and affect. Her behavior is normal.   Nursing note and vitals reviewed.        Assessment:   Post-op, the patient is doing well.    Plan:     Encouraged patient to be sure to get plenty of lean protein per day through small frequent meals all with a protein source.   Activity restrictions: none.   Recommended patient be sure to get at least 70 grams of protein per day by eating small, frequent meals all with high lean protein choices. Be sure to limit/cut back on daily carbohydrate intake. Discussed with the patient the recommended amount of water per day to intake- half of body weight in ounces. Reviewed vitamin requirements. Be sure to do routine exercise, 150 minutes per week minimum, including both cardio and  strength training.     Instructions / Recommendations: dietary counseling recommended, recommended a daily protein intake of  grams, vitamin supplement(s) recommended, recommended exercising at least 150 minutes per week, behavior modifications recommended and instructed to call the office for concerns, questions, or problems.     The patient was instructed to follow up in 3 months.   Discussed the difference between OTV multivitamins and bariatric specific multivitamins.     Encouraged to keep up the good work regarding protein intake, exercise, limiting carbs, how often she is eating, and all around calorie intake.     Patient encouraged to keep up the good work regarding protein choices, exercise, fluid intake.   The patient was counseled regarding dietary intake, protein, exercise. Total time spent face to face was 14 minutes and more than half the time was spent counseling.

## 2017-07-07 DIAGNOSIS — M25.50 MULTIPLE JOINT PAIN: ICD-10-CM

## 2017-07-10 RX ORDER — GABAPENTIN 800 MG/1
TABLET ORAL
Qty: 120 TABLET | Refills: 5 | OUTPATIENT
Start: 2017-07-10

## 2017-07-10 NOTE — TELEPHONE ENCOUNTER
I can not escribe this medication. Can you call it into the pharmacy? She can have it refilled - taking it exactly the same as previously prescribed, #120, with 5 refills.   Thank you.

## 2017-07-24 ENCOUNTER — OFFICE VISIT (OUTPATIENT)
Dept: PAIN MEDICINE | Facility: CLINIC | Age: 47
End: 2017-07-24

## 2017-07-24 VITALS
BODY MASS INDEX: 50.02 KG/M2 | SYSTOLIC BLOOD PRESSURE: 120 MMHG | HEART RATE: 71 BPM | TEMPERATURE: 99.3 F | WEIGHT: 293 LBS | HEIGHT: 64 IN | DIASTOLIC BLOOD PRESSURE: 82 MMHG | OXYGEN SATURATION: 97 % | RESPIRATION RATE: 16 BRPM

## 2017-07-24 DIAGNOSIS — M25.50 MULTIPLE JOINT PAIN: ICD-10-CM

## 2017-07-24 DIAGNOSIS — M48.061 LUMBAR SPINAL STENOSIS: Primary | ICD-10-CM

## 2017-07-24 DIAGNOSIS — K59.03 DRUG-INDUCED CONSTIPATION: ICD-10-CM

## 2017-07-24 LAB
POC AMPHETAMINES: NEGATIVE
POC BARBITURATES: NEGATIVE
POC BENZODIAZEPHINES: NEGATIVE
POC COCAINE: NEGATIVE
POC METHADONE: NEGATIVE
POC METHAMPHETAMINE SCREEN URINE: NEGATIVE
POC OPIATES: POSITIVE
POC OXYCODONE: NEGATIVE
POC PHENCYCLIDINE: NEGATIVE
POC PROPOXYPHENE: NEGATIVE
POC THC: NEGATIVE
POC TRICYCLIC ANTIDEPRESSANTS: NEGATIVE

## 2017-07-24 PROCEDURE — 99213 OFFICE O/P EST LOW 20 MIN: CPT | Performed by: PAIN MEDICINE

## 2017-07-24 PROCEDURE — 80305 DRUG TEST PRSMV DIR OPT OBS: CPT | Performed by: PAIN MEDICINE

## 2017-07-24 RX ORDER — HYDROCODONE BITARTRATE AND ACETAMINOPHEN 7.5; 325 MG/1; MG/1
1 TABLET ORAL 2 TIMES DAILY PRN
Qty: 60 TABLET | Refills: 0 | Status: SHIPPED | OUTPATIENT
Start: 2017-07-24 | End: 2017-08-18 | Stop reason: SDUPTHER

## 2017-07-24 RX ORDER — HYDROCODONE BITARTRATE AND ACETAMINOPHEN 7.5; 325 MG/1; MG/1
1 TABLET ORAL 2 TIMES DAILY PRN
Qty: 60 TABLET | Refills: 0 | Status: SHIPPED | OUTPATIENT
Start: 2017-07-24 | End: 2017-10-17 | Stop reason: SDUPTHER

## 2017-07-24 NOTE — PROGRESS NOTES
CHIEF COMPLAINT: Back Pain    HPI  Angie Perera is a 47 y.o. female.  She is here to follow up for Back Pain    Since last visit their pain has worsened . States her right hip radiating down right leg pain has increased. Her back pain is unchanged. She thinks it may be due to increased physical activity. She works out 7 days a week. 3 days of weight training and 4 days of aquatic therapy. States she has been taking extra tylenol with pain medicine (1500 mg extra).    The patient states their pain is a 7 on a scale of 1-10. The patient describes this pain as constant dull, ache and sharp.  The pain is located in Low Back and radiates into right hip, lateral aspect of R leg to just below R knee. This painful problem is aggravated by lifting and standing,walking,sitting > 45 minutes and is alleviated by relaxation, pain medication, ice and heating pad. Intermittent numbness over right anterior thigh.     Past pain medications: hydrocodone 7.5/325 mg - tid - helping but became ineffective  Hydrocodone 10/325 mg tid- last dose was 9/2016  Prednisone dose pack- significant help  Morphine ER 30 mg bid - made her sick and itching- stopped after her Bariatric surgery.   Tylenol q8h prn      Current pain medications:   Norco 7.5/325 mg bid prn pain   Gabapentin 800 mg qid - helping  Flexeril 10 mg bid - no help with pain but helps her sleep     Past therapies:  Physical Therapy: yes- currently going to water therapy three times/week.   Chiropractor: yes  Massage Therapy: no  TENS: yes  Neck or back surgery: no      Previous Injection: right knee injection - 1/16/2017  Effect of Injection (%): minimal help      Previous Injections: yes, total of 7 LESIs, most recently at [facility] in 2011  Effect of Injection (%): minimal help  Length of Relief: couple weeks     PEG Assessment   What number best describes your pain on average in the past week? 8  What number best describes how, during the past week, pain has  interfered with your enjoyment of life? 8  What number best describes how, during the past week, pain has interfered with your general activity? 8      Current Outpatient Prescriptions:   •  allopurinol (ZYLOPRIM) 300 MG tablet, Take 300 mg by mouth every morning., Disp: , Rfl:   •  amLODIPine (NORVASC) 5 MG tablet, TK 1 T PO QD FOR BP, Disp: , Rfl: 1  •  Calcium Carbonate-Vit D-Min (CALCIUM 1200 PO), Take 1,200 mg by mouth 2 (two) times a day., Disp: , Rfl:   •  carvedilol (COREG) 12.5 MG tablet, TK 1 T PO  D, Disp: , Rfl: 5  •  Cholecalciferol (VITAMIN D3) 5000 UNITS capsule capsule, Take 5,000 Units by mouth Daily., Disp: , Rfl:   •  cyclobenzaprine (FLEXERIL) 10 MG tablet, TAKE 1 TABLET BY MOUTH THREE TIMES DAILY AS NEEDED FOR MUSCLE SPASMS, Disp: 90 tablet, Rfl: 1  •  fluticasone (FLONASE) 50 MCG/ACT nasal spray, SHAKE WELL AND U 1 SPR IEN QAM AND QHS, Disp: , Rfl: 11  •  furosemide (LASIX) 40 MG tablet, Take 40 mg by mouth every morning., Disp: , Rfl:   •  gabapentin (NEURONTIN) 800 MG tablet, TAKE 1 TABLET BY MOUTH FOUR TIMES DAILY, Disp: 120 tablet, Rfl: 5  •  HYDROcodone-acetaminophen (NORCO) 7.5-325 MG per tablet, Take 1 tablet by mouth 2 (Two) Times a Day As Needed (pain)., Disp: 60 tablet, Rfl: 0  •  VENTOLIN  (90 BASE) MCG/ACT inhaler, INHALE 2 PUFFS PO QID PRF BREATHING, Disp: , Rfl: 2  •  HYDROcodone-acetaminophen (NORCO) 7.5-325 MG per tablet, Take 1 tablet by mouth 2 (Two) Times a Day As Needed (pain)., Disp: 60 tablet, Rfl: 0    IMAGING  lumbar MRI 11/26/2014:  Impression:  1. At L4/5, there is spondylolyses and listhesis which is mildly increased from the prior exam. Broad-based protruding disc is also increased from the prior exam and there is a dorsal right paracentral epidural fluid collection as described above that is probably represent a synovial or ligamentum flavum cyst. The combination of finding results in relatively severe spinal stenosis which is increased from the prior exam as  "well as severe left and moderately severe right foraminal stenosis and impingement of the L4 nerve root bilaterally.  2. At L5-S1 there is a 2-3 mm central and right paracentral disc bulge which appears stable.  This lumbar MRI report was completed [facility] and will be scanned into her permanent record.    PFSH:  The following portions of the patient's history were reviewed and updated as appropriate: problem list, past medical history, past surgery history, social history, family history, medications, and allergies    Review of Systems   Constitutional: Positive for appetite change (decreased). Negative for activity change, chills and fever.   HENT: Negative for ear discharge and ear pain.    Eyes: Negative for pain.   Respiratory: Negative for apnea, cough, shortness of breath and wheezing.    Cardiovascular: Negative for chest pain and palpitations.   Gastrointestinal: Negative for abdominal pain, constipation, diarrhea, nausea and vomiting.   Genitourinary: Negative for difficulty urinating.   Musculoskeletal: Positive for back pain.   Neurological: Positive for numbness (down leg) and headaches. Negative for dizziness, weakness and light-headedness.   Psychiatric/Behavioral: Negative for agitation, confusion, hallucinations, sleep disturbance (pt states she is on melatonin) and suicidal ideas. The patient is not nervous/anxious.    All other systems reviewed and are negative.      Vitals:    07/24/17 1245   BP: 120/82   Pulse: 71   Resp: 16   Temp: 99.3 °F (37.4 °C)   SpO2: 97%   Weight: (!) 300 lb 12.8 oz (136 kg)   Height: 64\" (162.6 cm)   PainSc:   7   PainLoc: Hip  Comment: right hip rad down right leg       Physical Exam   Constitutional: She is oriented to person, place, and time. She appears well-developed and well-nourished. No distress.   HENT:   Head: Normocephalic and atraumatic.   Nose: Nose normal.   Mouth/Throat: Oropharynx is clear and moist.   Eyes: Conjunctivae and EOM are " normal.   Neck: Normal range of motion. Neck supple.   Pulmonary/Chest: Effort normal. No stridor. No respiratory distress.   Abdominal: Soft. There is no tenderness. There is no rebound and no guarding.   Musculoskeletal:        Right hip: She exhibits decreased range of motion (due to size) and decreased strength. She exhibits no tenderness and no swelling.        Left hip: She exhibits decreased range of motion (due to size). She exhibits normal strength and no tenderness.        Lumbar back: She exhibits decreased range of motion (due to size). She exhibits no tenderness, no bony tenderness and no pain.   Neurological: She is alert and oriented to person, place, and time. She has normal strength. No cranial nerve deficit or sensory deficit.   Skin: Skin is warm and dry. No rash noted. She is not diaphoretic.   Psychiatric: She has a normal mood and affect. Her speech is normal and behavior is normal.   Nursing note and vitals reviewed.    Ortho Exam  Neurologic Exam     Mental Status   Oriented to person, place, and time.   Speech: speech is normal     Cranial Nerves     CN III, IV, VI   Extraocular motions are normal.     Motor Exam     Strength   Strength 5/5 throughout.       Lab Results   Component Value Date    POCMETH Negative 02/20/2017    POCAMPHET Negative 02/20/2017    POCBARBITUR Negative 02/20/2017    POCBENZO Negative 02/20/2017    POCCOCAINE Negative 02/20/2017    POCMETHADO Negative 02/20/2017    POCOPIATES Positive 02/20/2017    POCOXYCODO Negative 02/20/2017    POCPHENCYC Negative 02/20/2017    POCPROPOXY Negative 02/20/2017    POCTHC Negative 02/20/2017    POCTRICYC Positive 02/20/2017     Last UDS results reviewed: 07/24/17   Last UDS: 2/20/2017      Date of last ASAEL reviewed : 07/24/17   Comments: Consistent     Assessment/Plan   Angie was seen today for back pain.    Diagnoses and all orders for this visit:    Lumbar spinal stenosis    Multiple joint pain    Drug-induced  constipation    Other orders  -     HYDROcodone-acetaminophen (NORCO) 7.5-325 MG per tablet; Take 1 tablet by mouth 2 (Two) Times a Day As Needed (pain).  -     HYDROcodone-acetaminophen (NORCO) 7.5-325 MG per tablet; Take 1 tablet by mouth 2 (Two) Times a Day As Needed (pain).      Requested Prescriptions     Signed Prescriptions Disp Refills   • HYDROcodone-acetaminophen (NORCO) 7.5-325 MG per tablet 60 tablet 0     Sig: Take 1 tablet by mouth 2 (Two) Times a Day As Needed (pain).   • HYDROcodone-acetaminophen (NORCO) 7.5-325 MG per tablet 60 tablet 0     Sig: Take 1 tablet by mouth 2 (Two) Times a Day As Needed (pain).       - Takes metamucil, increased water intake, stool softener daily and eats vegatable to help with constipation. Continue bowel regimen for chronic constipation.  - Last UDS performed was reviewed and consistent.   - UDS done today, will check on next visit.   - CONTINUE norco at 7.5 mg bid prn for pain. (#60, 1 refills)   - Two months prescriptions given today. Appropriate DNF dates applied.   - Given history of discharge for abnormal UDS - will monitor closely.   - She is on a significantly lower dose of narcotic medication than she was approx 1 year ago when she was on morphine bid as well as norco tid. Will continue her on her lower dose as it is effective and will not increase her back up to her high dose she was previously on.   - Patient is to continue to lose weight and along with her multimodal regimen hopes to wean off his narcotic medication in the future.  - Continue Gabapentin at current dose of 800 mg qid as it is currently helping. No refills needed today.   - Continue flexeril 10 mg tid prn as it is currently helping.   - Given poor response to injections in the past will not plan on repeating epidurals at this time.   - States she will consider spine surgery after weight loss or if pain/numbness/weakness worsens given lumbar spinal stenosis seen on MRI. We will hold off for  now.  - Continue home exercise program. Continue work out 7 days/week.     Wt Readings from Last 3 Encounters:   07/24/17 (!) 300 lb 12.8 oz (136 kg)   06/08/17 298 lb (135 kg)   05/22/17 283 lb (128 kg)     Body mass index is 51.63 kg/(m^2). Patient counseled on the importance of weight loss to help with overall health and pain control. Patient instructed to attempt weight loss.   Plan: Calorie counting  increase physical activity, reduce portion size, cut out extra servings and reduce fast food intake  has increased water intake, up to 1 gallon now, underwent gastric sleeve 8/2016 lost #146    Follow-up in 2 months.    ASAEL REPORT  As part of the patient's treatment plan, I am prescribing controlled substances. The patient has been made aware of appropriate use of such medications, including potential risk of somnolence, limited ability to drive and/or work safely, and the potential for dependence or overdose. It has also bee made clear that these medications are for use by this patient only, without concomitant use of alcohol or other substances unless prescribed.     Patient has completed prescribing agreement detailing terms of continued prescribing of controlled substances, including monitoring ASAEL reports, urine drug screening, and pill counts if necessary. The patient is aware that inappropriate use will results in cessation of prescribing such medications.    ASAEL report has been reviewed and scanned into the patient's chart.    History and physical exam exhibit continued safe and appropriate use of controlled substances.     Shanda Dye MD  Pain Management

## 2017-08-03 ENCOUNTER — TELEPHONE (OUTPATIENT)
Dept: PAIN MEDICINE | Facility: CLINIC | Age: 47
End: 2017-08-03

## 2017-08-03 DIAGNOSIS — M48.061 LUMBAR SPINAL STENOSIS: Primary | ICD-10-CM

## 2017-08-03 DIAGNOSIS — M25.50 MULTIPLE JOINT PAIN: ICD-10-CM

## 2017-08-03 DIAGNOSIS — E66.01 OBESITY, CLASS III, BMI 40-49.9 (MORBID OBESITY) (HCC): ICD-10-CM

## 2017-08-03 NOTE — TELEPHONE ENCOUNTER
Patient called and wanted to know if she can get some Physical Therapy and she is requesting Prednisone.

## 2017-08-03 NOTE — TELEPHONE ENCOUNTER
I just placed an order for physical therapy. Prednisone? Like a dose pack? For a flare up of something? She will need to be evaluated for this.

## 2017-08-04 DIAGNOSIS — M25.50 MULTIPLE JOINT PAIN: ICD-10-CM

## 2017-08-04 RX ORDER — CYCLOBENZAPRINE HCL 10 MG
TABLET ORAL
Qty: 90 TABLET | Refills: 2 | Status: SHIPPED | OUTPATIENT
Start: 2017-08-04 | End: 2017-11-08 | Stop reason: SDUPTHER

## 2017-08-18 ENCOUNTER — OFFICE VISIT (OUTPATIENT)
Dept: PAIN MEDICINE | Facility: CLINIC | Age: 47
End: 2017-08-18

## 2017-08-18 VITALS
WEIGHT: 293 LBS | SYSTOLIC BLOOD PRESSURE: 137 MMHG | HEIGHT: 64 IN | DIASTOLIC BLOOD PRESSURE: 90 MMHG | BODY MASS INDEX: 50.02 KG/M2 | TEMPERATURE: 98.4 F | HEART RATE: 84 BPM | OXYGEN SATURATION: 96 % | RESPIRATION RATE: 16 BRPM

## 2017-08-18 DIAGNOSIS — M53.3 SACROILIAC JOINT DYSFUNCTION: ICD-10-CM

## 2017-08-18 DIAGNOSIS — M48.061 LUMBAR SPINAL STENOSIS: Primary | ICD-10-CM

## 2017-08-18 DIAGNOSIS — M25.50 MULTIPLE JOINT PAIN: ICD-10-CM

## 2017-08-18 PROCEDURE — 99214 OFFICE O/P EST MOD 30 MIN: CPT | Performed by: PAIN MEDICINE

## 2017-08-18 RX ORDER — METHYLPREDNISOLONE 4 MG/1
TABLET ORAL
Qty: 21 TABLET | Refills: 0 | Status: SHIPPED | OUTPATIENT
Start: 2017-08-18 | End: 2017-09-07

## 2017-08-18 RX ORDER — HYDROCODONE BITARTRATE AND ACETAMINOPHEN 7.5; 325 MG/1; MG/1
1 TABLET ORAL 2 TIMES DAILY PRN
Qty: 60 TABLET | Refills: 0 | Status: SHIPPED | OUTPATIENT
Start: 2017-08-18 | End: 2017-09-07

## 2017-08-18 RX ORDER — HYDROCODONE BITARTRATE AND ACETAMINOPHEN 7.5; 325 MG/1; MG/1
1 TABLET ORAL 2 TIMES DAILY PRN
Qty: 60 TABLET | Refills: 0 | Status: SHIPPED | OUTPATIENT
Start: 2017-08-18 | End: 2017-08-18 | Stop reason: SDUPTHER

## 2017-08-18 NOTE — PROGRESS NOTES
CHIEF COMPLAINT: Back Pain    HPI  Angie Perera is a 47 y.o. female.  She is here to follow up for Back Pain    Since last visit their pain has worsened . Pt states her right hip pain has increased and she is requesting methylprednisone pack. She has decreased her exercising from 6 to 3 times a week. She is sleeping more. Her legs have been swelling so she thinks the weight gain is related to water pill not working as well.   Last time she received a dose pack was 3/2017 for bronchitis.     Right hip pain flares up approx once/year. Usually resolves with steroid dose pack. Last received for pain was over 1 year ago. This is a new problem to examiner. Her low back pain is stable at this time. No change, stable on medication without side effects. Low back pain is localized to low back without radiation and is a constant dull pain.     The patient states their pain is a 8 on a scale of 1-10.  The patient describes this pain as constant dull and ache.  The pain is located in right lateral and posterior hip and radiates to right hip and not into RLE. This painful problem is aggravated by physical activity and weight bearing and is alleviated by relaxation and pain medication.    Past pain medications: hydrocodone 7.5/325 mg - tid - helping but became ineffective  Hydrocodone 10/325 mg tid- last dose was 9/2016  Prednisone dose pack- significant help  Morphine ER 30 mg bid - made her sick and itching- stopped after her Bariatric surgery.   Tylenol q8h prn      Current pain medications:   Norco 7.5/325 mg bid prn pain   Gabapentin 800 mg qid - helping  Flexeril 10 mg bid - no help with pain but helps her sleep      Past therapies:  Physical Therapy: yes- currently going to water therapy three times/week.   Chiropractor: yes  Massage Therapy: no  TENS: yes  Neck or back surgery: no      Previous Injection: right knee injection - 1/16/2017  Effect of Injection (%): minimal help      Previous Injections: yes, total of 7  [place], most recently at [facility] in 2011  Effect of Injection (%): minimal help  Length of Relief: couple weeks     PEG Assessment   What number best describes your pain on average in the past week? 8  What number best describes how, during the past week, pain has interfered with your enjoyment of life? 8  What number best describes how, during the past week, pain has interfered with your general activity? 8      Current Outpatient Prescriptions:   •  allopurinol (ZYLOPRIM) 300 MG tablet, Take 300 mg by mouth every morning., Disp: , Rfl:   •  amLODIPine (NORVASC) 5 MG tablet, TK 1 T PO QD FOR BP, Disp: , Rfl: 1  •  Calcium Carbonate-Vit D-Min (CALCIUM 1200 PO), Take 1,200 mg by mouth 2 (two) times a day., Disp: , Rfl:   •  carvedilol (COREG) 12.5 MG tablet, TK 1 T PO  D, Disp: , Rfl: 5  •  Cholecalciferol (VITAMIN D3) 5000 UNITS capsule capsule, Take 5,000 Units by mouth Daily., Disp: , Rfl:   •  cyclobenzaprine (FLEXERIL) 10 MG tablet, TAKE 1 TABLET BY MOUTH THREE TIMES DAILY AS NEEDED FOR MUSCLE SPASMS, Disp: 90 tablet, Rfl: 2  •  fluticasone (FLONASE) 50 MCG/ACT nasal spray, SHAKE WELL AND U 1 SPR IEN QAM AND QHS, Disp: , Rfl: 11  •  furosemide (LASIX) 40 MG tablet, Take 40 mg by mouth every morning., Disp: , Rfl:   •  gabapentin (NEURONTIN) 800 MG tablet, TAKE 1 TABLET BY MOUTH FOUR TIMES DAILY, Disp: 120 tablet, Rfl: 5  •  HYDROcodone-acetaminophen (NORCO) 7.5-325 MG per tablet, Take 1 tablet by mouth 2 (Two) Times a Day As Needed (pain)., Disp: 60 tablet, Rfl: 0  •  HYDROcodone-acetaminophen (NORCO) 7.5-325 MG per tablet, Take 1 tablet by mouth 2 (Two) Times a Day As Needed (pain)., Disp: 60 tablet, Rfl: 0  •  VENTOLIN  (90 BASE) MCG/ACT inhaler, INHALE 2 PUFFS PO QID PRF BREATHING, Disp: , Rfl: 2    IMAGING  lumbar MRI 11/26/2014:  Impression:  1. At L4/5, there is spondylolyses and listhesis which is mildly increased from the prior exam. Broad-based protruding disc is also increased from the  "prior exam and there is a dorsal right paracentral epidural fluid collection as described above that is probably represent a synovial or ligamentum flavum cyst. The combination of finding results in relatively severe spinal stenosis which is increased from the prior exam as well as severe left and moderately severe right foraminal stenosis and impingement of the L4 nerve root bilaterally.  2. At L5-S1 there is a 2-3 mm central and right paracentral disc bulge which appears stable.  This lumbar MRI report was completed at [facility] and will be scanned into her permanent record.    PFSH:  The following portions of the patient's history were reviewed and updated as appropriate: problem list, past medical history, past surgery history, social history, family history, medications, and allergies    Review of Systems   Constitutional: Positive for appetite change (decreased). Negative for activity change, chills and fever.   HENT: Negative for ear discharge and ear pain.    Eyes: Negative for pain.   Respiratory: Negative for apnea, cough, shortness of breath and wheezing.    Cardiovascular: Positive for leg swelling. Negative for chest pain and palpitations.   Gastrointestinal: Negative for abdominal pain, constipation, diarrhea, nausea and vomiting.   Genitourinary: Negative for difficulty urinating.   Musculoskeletal: Positive for back pain.   Neurological: Positive for numbness (down leg). Negative for dizziness, weakness, light-headedness and headaches.   Psychiatric/Behavioral: Negative for agitation, confusion, hallucinations, sleep disturbance (pt states she is on melatonin) and suicidal ideas. The patient is not nervous/anxious.    All other systems reviewed and are negative.      Vitals:    08/18/17 1040   BP: 137/90   Pulse: 84   Resp: 16   Temp: 98.4 °F (36.9 °C)   SpO2: 96%   Weight: (!) 303 lb (137 kg)   Height: 64\" (162.6 cm)   PainSc:   8   PainLoc: Back  Comment: and hip       Physical Exam "   Constitutional: She is oriented to person, place, and time. She appears well-developed and well-nourished. No distress.   HENT:   Head: Normocephalic and atraumatic.   Nose: Nose normal.   Mouth/Throat: Oropharynx is clear and moist.   Eyes: Conjunctivae and EOM are normal.   Neck: Normal range of motion. Neck supple.   Pulmonary/Chest: Effort normal. No stridor. No respiratory distress.   Abdominal: Soft. There is no tenderness. There is no rebound and no guarding.   Musculoskeletal:        Right hip: She exhibits decreased range of motion (due to size) and decreased strength. She exhibits no tenderness and no swelling.        Left hip: She exhibits decreased range of motion (due to size). She exhibits normal strength and no tenderness.        Lumbar back: She exhibits decreased range of motion (due to size). She exhibits no tenderness, no bony tenderness and no pain.   Neurological: She is alert and oriented to person, place, and time. She has normal strength. No cranial nerve deficit or sensory deficit.   Skin: Skin is warm and dry. No rash noted. She is not diaphoretic.   Psychiatric: She has a normal mood and affect. Her speech is normal and behavior is normal.   Nursing note and vitals reviewed.    Right Hip Exam     Tests   HOMER: positive      Left Hip Exam     Tests   HOMER: negative          Neurologic Exam     Mental Status   Oriented to person, place, and time.   Speech: speech is normal     Cranial Nerves     CN III, IV, VI   Extraocular motions are normal.     Motor Exam     Strength   Strength 5/5 throughout.     Gait, Coordination, and Reflexes     Gait  Gait: shuffling      Lab Results   Component Value Date    POCMETH Negative 07/24/2017    POCAMPHET Negative 07/24/2017    POCBARBITUR Negative 07/24/2017    POCBENZO Negative 07/24/2017    POCCOCAINE Negative 07/24/2017    POCMETHADO Negative 07/24/2017    POCOPIATES Positive (A) 07/24/2017    POCOXYCODO Negative 07/24/2017    POCPHENCYC Negative  07/24/2017    POCPROPOXY Negative 07/24/2017    POCTHC Negative 07/24/2017    POCTRICYC Negative 07/24/2017     Last UDS results reviewed: 08/18/17   Last UDS: 7/24/17      Date of last ASAEL reviewed : 08/18/17  Comments: Consistent    Assessment/Plan   Angie was seen today for back pain.    Diagnoses and all orders for this visit:    Lumbar spinal stenosis    Multiple joint pain    Other orders  -     HYDROcodone-acetaminophen (NORCO) 7.5-325 MG per tablet; Take 1 tablet by mouth 2 (Two) Times a Day As Needed (pain).    Requested Prescriptions     Pending Prescriptions Disp Refills   • HYDROcodone-acetaminophen (NORCO) 7.5-325 MG per tablet 60 tablet 0     Sig: Take 1 tablet by mouth 2 (Two) Times a Day As Needed (pain).     - likely flare up of right SI joint.   - I usually do not prescribe steroid dose pack for pain flare ups given their systemic effects. I recommend receiving local steroid injections into the area that is inflamed to decrease overall side effects. Patient is very hesitant to receive injections. Given she does not want injections and she has received steroid dose pack in the past with good results, will prescribe this one time. Side effects discussed.   - Plans to start milestones in next two weeks.   - Random urine drug screen per office policy AT NEXT VISIT.   - Last UDS performed was reviewed and consistent.    - Takes metamucil, increased water intake, stool softener daily and eats vegatable to help with constipation. Continue bowel regimen for chronic constipation.  - CONTINUE norco at 7.5 mg bid prn for pain. Needs refill in two weeks, will give today. Follow up in 6 weeks when next refill is needed. (#60, 0 refills)  Appropriate DNF dates applied.   - Given history of discharge for abnormal UDS - will monitor closely.   - She is on a significantly lower dose of narcotic medication than she was approx 1 year ago when she was on morphine bid as well as norco tid. Will continue her on her  lower dose as it is effective and will not increase her back up to her high dose she was previously on.   - Patient is to continue to lose weight and along with her multimodal regimen hopes to wean off his narcotic medication in the future.  - Continue Gabapentin at current dose of 800 mg qid as it is currently helping. No refills needed today.   - Continue flexeril 10 mg tid prn as it is currently helping.   - Given poor response to injections in the past will not plan on repeating epidurals at this time.   - States she will consider spine surgery after weight loss or if pain/numbness/weakness worsens given lumbar spinal stenosis seen on MRI. We will hold off for now.  - Continue home exercise program.      Wt Readings from Last 3 Encounters:   08/18/17 (!) 303 lb (137 kg)   07/24/17 (!) 300 lb 12.8 oz (136 kg)   06/08/17 298 lb (135 kg)     Body mass index is 52.01 kg/(m^2). Patient counseled on the importance of weight loss to help with overall health and pain control. Patient instructed to attempt weight loss.   Plan: Calorie counting  increase physical activity, reduce portion size, cut out extra servings and reduce fast food intake - patient is to start aquatic therapy in next 2 weeks to help increase cardiovascular exercise.     Follow-up in 6 weeks.    Shanda Dye MD  Pain Management      ASAEL REPORT  As part of the patient's treatment plan, I am prescribing controlled substances. The patient has been made aware of appropriate use of such medications, including potential risk of somnolence, limited ability to drive and/or work safely, and the potential for dependence or overdose. It has also bee made clear that these medications are for use by this patient only, without concomitant use of alcohol or other substances unless prescribed.   Patient has completed prescribing agreement detailing terms of continued prescribing of controlled substances, including monitoring ASAEL reports, urine drug  screening, and pill counts if necessary. The patient is aware that inappropriate use will results in cessation of prescribing such medications.  ASAEL report has been reviewed and scanned into the patient's chart.  History and physical exam exhibit continued safe and appropriate use of controlled substances.

## 2017-08-24 ENCOUNTER — HOSPITAL ENCOUNTER (OUTPATIENT)
Dept: PHYSICAL THERAPY | Facility: HOSPITAL | Age: 47
Setting detail: THERAPIES SERIES
Discharge: HOME | End: 2017-08-24
Attending: PAIN MEDICINE

## 2017-08-24 DIAGNOSIS — G89.29 CHRONIC RIGHT-SIDED LOW BACK PAIN WITH RIGHT-SIDED SCIATICA: Primary | ICD-10-CM

## 2017-08-24 DIAGNOSIS — M48.061 LUMBAR STENOSIS: ICD-10-CM

## 2017-08-24 DIAGNOSIS — M43.16 SPONDYLOLISTHESIS AT L4-L5 LEVEL: ICD-10-CM

## 2017-08-24 DIAGNOSIS — M54.41 CHRONIC RIGHT-SIDED LOW BACK PAIN WITH RIGHT-SIDED SCIATICA: Primary | ICD-10-CM

## 2017-08-24 PROCEDURE — 97161 PT EVAL LOW COMPLEX 20 MIN: CPT | Performed by: PHYSICAL THERAPIST
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
## 2017-08-28 ENCOUNTER — HOSPITAL ENCOUNTER (OUTPATIENT)
Dept: PHYSICAL THERAPY | Facility: HOSPITAL | Age: 47
Setting detail: THERAPIES SERIES
Discharge: HOME | End: 2017-08-28

## 2017-08-28 DIAGNOSIS — G89.29 CHRONIC RIGHT-SIDED LOW BACK PAIN WITH RIGHT-SIDED SCIATICA: Primary | ICD-10-CM

## 2017-08-28 DIAGNOSIS — M54.41 CHRONIC RIGHT-SIDED LOW BACK PAIN WITH RIGHT-SIDED SCIATICA: Primary | ICD-10-CM

## 2017-08-28 DIAGNOSIS — M48.061 LUMBAR STENOSIS: ICD-10-CM

## 2017-08-28 DIAGNOSIS — M43.16 SPONDYLOLISTHESIS AT L4-L5 LEVEL: ICD-10-CM

## 2017-08-28 PROCEDURE — 97113 AQUATIC THERAPY/EXERCISES: CPT | Performed by: PHYSICAL THERAPIST

## 2017-08-28 NOTE — THERAPY TREATMENT NOTE
Outpatient Physical Therapy Ortho Treatment Note  [facility]     Patient Name: Angie Perera  : 1970  MRN: [id number]  Today's Date: 2017      Visit Date: 2017    Visit Dx:    ICD-10-CM ICD-9-CM   1. Chronic right-sided low back pain with right-sided sciatica M54.41 724.2    G89.29 724.3     338.29   2. Spondylolisthesis at L4-L5 level M43.16 756.12   3. Lumbar stenosis M48.06 724.02       Patient Active Problem List   Diagnosis   • Essential hypertension   • Localized edema   • Snoring   • Multiple joint pain   • History of Bell's palsy   • Depression   • Prediabetes   • Chronic gout   • Dietary counseling   • Constipation   • Lumbar spinal stenosis   • Obesity, Class III, BMI 40-49.9 (morbid obesity)        Past Medical History:   Diagnosis Date   • Back pain    • Depression    • Depression    • Edema    • Fatigue    • Fibromyalgia    • Gout    • H/o Lyme disease    • History of Bell's palsy     X3   • Hypertension    • Joint pain    • Prediabetes    • Spinal stenosis         Past Surgical History:   Procedure Laterality Date   • BREAST BIOPSY     • BREAST LUMPECTOMY Right    • DILATATION AND CURETTAGE     • ENDOSCOPY N/A 2016    Procedure: ESOPHAGOGASTRODUODENOSCOPY with biopsy for urease;  Surgeon: Sanchez Clark Jr., MD;  Location: [facility] ENDOSCOPY;  Service:    • GASTRIC SLEEVE LAPAROSCOPIC N/A 8/15/2016    Procedure: GASTRIC SLEEVE LAPAROSCOPIC;  Surgeon: Sanchez Clark Jr., MD;  Location: [facility] [place] [facility] [place];  Service:    • TUBAL ABDOMINAL LIGATION                               PT Assessment/Plan       17 0941       PT Assessment    Assessment Comments Instructed Angie in aquatic exercise for core strength with verbal cues for proper technique as well as optimal depth of water for each particular exercise and speed of movement.  -PÉREZ       User Key  (r) = Recorded By, (t) = Taken By, (c) = Cosigned By    Initials Name Provider Type    PÉREZ Sainz, PT  Physical Therapist                    Exercises       08/28/17 0900          Subjective Comments    Subjective Comments usually does 2 sets of 75 squats in water. -Recommended pt decrease to 25 reps.  -PÉREZ      Subjective Pain    Able to rate subjective pain? yes  -PÉREZ      Pre-Treatment Pain Level 6  -PÉREZ      Subjective Pain Comment Knees usually hurt after doing self aquatic exercise.   -PÉREZ      Aquatics    Aquatics performed? Yes  -PÉREZ      Exercise 1    Exercise Name 1 Aquatic exercise as noted on flow sheet.  -PÉREZ        User Key  (r) = Recorded By, (t) = Taken By, (c) = Cosigned By    Initials Name Provider Type    PÉREZ Sainz, PT Physical Therapist                                       Time Calculation:   Start Time: 0902  Stop Time: 0945  Time Calculation (min): 43 min    Therapy Charges for Today     Code Description Service Date Service Provider Modifiers Qty    83636906754  PT AQUATIC THERAPY EA 15 MIN 8/28/2017 Audie Sainz, PT GP 3                    Audie Sainz, PT  8/28/2017

## 2017-08-31 ENCOUNTER — HOSPITAL ENCOUNTER (OUTPATIENT)
Dept: PHYSICAL THERAPY | Facility: HOSPITAL | Age: 47
Setting detail: THERAPIES SERIES
Discharge: HOME | End: 2017-08-31

## 2017-08-31 DIAGNOSIS — M54.41 CHRONIC RIGHT-SIDED LOW BACK PAIN WITH RIGHT-SIDED SCIATICA: Primary | ICD-10-CM

## 2017-08-31 DIAGNOSIS — M48.061 LUMBAR STENOSIS: ICD-10-CM

## 2017-08-31 DIAGNOSIS — G89.29 CHRONIC RIGHT-SIDED LOW BACK PAIN WITH RIGHT-SIDED SCIATICA: Primary | ICD-10-CM

## 2017-08-31 DIAGNOSIS — M43.16 SPONDYLOLISTHESIS AT L4-L5 LEVEL: ICD-10-CM

## 2017-08-31 PROCEDURE — 97113 AQUATIC THERAPY/EXERCISES: CPT | Performed by: PHYSICAL THERAPIST

## 2017-09-06 ENCOUNTER — HOSPITAL ENCOUNTER (OUTPATIENT)
Dept: PHYSICAL THERAPY | Facility: HOSPITAL | Age: 47
Setting detail: THERAPIES SERIES
Discharge: HOME | End: 2017-09-06

## 2017-09-06 DIAGNOSIS — M48.061 LUMBAR STENOSIS: ICD-10-CM

## 2017-09-06 DIAGNOSIS — G89.29 CHRONIC RIGHT-SIDED LOW BACK PAIN WITH RIGHT-SIDED SCIATICA: Primary | ICD-10-CM

## 2017-09-06 DIAGNOSIS — M54.41 CHRONIC RIGHT-SIDED LOW BACK PAIN WITH RIGHT-SIDED SCIATICA: Primary | ICD-10-CM

## 2017-09-06 DIAGNOSIS — M43.16 SPONDYLOLISTHESIS AT L4-L5 LEVEL: ICD-10-CM

## 2017-09-06 PROCEDURE — 97113 AQUATIC THERAPY/EXERCISES: CPT | Performed by: PHYSICAL THERAPIST
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
## 2017-09-07 ENCOUNTER — OFFICE VISIT (OUTPATIENT)
Dept: BARIATRICS/WEIGHT MGMT | Facility: CLINIC | Age: 47
End: 2017-09-07

## 2017-09-07 VITALS
HEART RATE: 72 BPM | HEIGHT: 64 IN | TEMPERATURE: 98 F | WEIGHT: 293 LBS | RESPIRATION RATE: 18 BRPM | DIASTOLIC BLOOD PRESSURE: 88 MMHG | BODY MASS INDEX: 50.02 KG/M2 | SYSTOLIC BLOOD PRESSURE: 139 MMHG

## 2017-09-07 DIAGNOSIS — F32.A DEPRESSION, UNSPECIFIED DEPRESSION TYPE: ICD-10-CM

## 2017-09-07 DIAGNOSIS — Z71.3 DIETARY COUNSELING: ICD-10-CM

## 2017-09-07 DIAGNOSIS — I10 ESSENTIAL HYPERTENSION: ICD-10-CM

## 2017-09-07 DIAGNOSIS — M25.50 MULTIPLE JOINT PAIN: ICD-10-CM

## 2017-09-07 PROCEDURE — 99214 OFFICE O/P EST MOD 30 MIN: CPT | Performed by: NURSE PRACTITIONER
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
## 2017-09-12 ENCOUNTER — HOSPITAL ENCOUNTER (OUTPATIENT)
Dept: PHYSICAL THERAPY | Facility: HOSPITAL | Age: 47
Setting detail: THERAPIES SERIES
End: 2017-09-12

## 2017-09-14 ENCOUNTER — HOSPITAL ENCOUNTER (OUTPATIENT)
Dept: PHYSICAL THERAPY | Facility: HOSPITAL | Age: 47
Setting detail: THERAPIES SERIES
Discharge: HOME | End: 2017-09-14

## 2017-09-14 DIAGNOSIS — M43.16 SPONDYLOLISTHESIS AT L4-L5 LEVEL: ICD-10-CM

## 2017-09-14 DIAGNOSIS — M48.061 LUMBAR STENOSIS: ICD-10-CM

## 2017-09-14 DIAGNOSIS — G89.29 CHRONIC RIGHT-SIDED LOW BACK PAIN WITH RIGHT-SIDED SCIATICA: Primary | ICD-10-CM

## 2017-09-14 DIAGNOSIS — M54.41 CHRONIC RIGHT-SIDED LOW BACK PAIN WITH RIGHT-SIDED SCIATICA: Primary | ICD-10-CM

## 2017-09-14 PROCEDURE — 97113 AQUATIC THERAPY/EXERCISES: CPT | Performed by: PHYSICAL THERAPIST
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
## 2017-09-18 ENCOUNTER — HOSPITAL ENCOUNTER (OUTPATIENT)
Dept: PHYSICAL THERAPY | Facility: HOSPITAL | Age: 47
Setting detail: THERAPIES SERIES
Discharge: HOME | End: 2017-09-18

## 2017-09-18 DIAGNOSIS — M43.16 SPONDYLOLISTHESIS AT L4-L5 LEVEL: ICD-10-CM

## 2017-09-18 DIAGNOSIS — G89.29 CHRONIC RIGHT-SIDED LOW BACK PAIN WITH RIGHT-SIDED SCIATICA: Primary | ICD-10-CM

## 2017-09-18 DIAGNOSIS — M54.41 CHRONIC RIGHT-SIDED LOW BACK PAIN WITH RIGHT-SIDED SCIATICA: Primary | ICD-10-CM

## 2017-09-18 DIAGNOSIS — M48.061 LUMBAR STENOSIS: ICD-10-CM

## 2017-09-18 PROCEDURE — 97113 AQUATIC THERAPY/EXERCISES: CPT | Performed by: PHYSICAL THERAPIST

## 2017-09-18 NOTE — THERAPY TREATMENT NOTE
Outpatient Physical Therapy Ortho Treatment Note  [facility]     Patient Name: Angie Perera  : 1970  MRN: [id number]  Today's Date: 2017      Visit Date: 2017    Visit Dx:    ICD-10-CM ICD-9-CM   1. Chronic right-sided low back pain with right-sided sciatica M54.41 724.2    G89.29 724.3     338.29   2. Spondylolisthesis at L4-L5 level M43.16 756.12   3. Lumbar stenosis M48.06 724.02       Patient Active Problem List   Diagnosis   • Essential hypertension   • Localized edema   • Snoring   • Multiple joint pain   • History of Bell's palsy   • Depression   • Prediabetes   • Chronic gout   • Dietary counseling   • Constipation   • Lumbar spinal stenosis   • Body mass index (BMI) of 50-59.9 in adult        Past Medical History:   Diagnosis Date   • Back pain    • Depression    • Depression    • Edema    • Fatigue    • Fibromyalgia    • Gout    • H/o Lyme disease    • History of Bell's palsy     X3   • Hypertension    • Joint pain    • Prediabetes    • Spinal stenosis         Past Surgical History:   Procedure Laterality Date   • BREAST BIOPSY     • BREAST LUMPECTOMY Right    • DILATATION AND CURETTAGE     • ENDOSCOPY N/A 2016    Procedure: ESOPHAGOGASTRODUODENOSCOPY with biopsy for urease;  Surgeon: Sanchez Clark Jr., MD;  [place] [facility] [place];  Service:    • GASTRIC SLEEVE LAPAROSCOPIC N/A 8/15/2016    Procedure: GASTRIC SLEEVE LAPAROSCOPIC;  Surgeon: Sanchez Clark Jr., MD;  [place] [facility] [place] [facility] [place]  Service:    • TUBAL ABDOMINAL LIGATION                               PT Assessment/Plan       17 1255       PT Assessment    Assessment Comments Goals addressed.  -PÉREZ       User Key  (r) = Recorded By, (t) = Taken By, (c) = Cosigned By    Initials Name Provider Type    PÉREZ Sainz, PT Physical Therapist                    Exercises       17 0900          Subjective Pain    Able to rate subjective pain? yes  -PÉREZ      Pre-Treatment Pain Level 5  -PÉREZ       Post-Treatment Pain Level 5  -PÉREZ      Subjective Pain Comment Did not take pain medicine this morning.  -PÉREZ      Aquatics    Aquatics performed? Yes  -PÉREZ      Exercise 1    Exercise Name 1 Continue combination strength training and cardio. as noted on flow sheer.  -PÉREZ        User Key  (r) = Recorded By, (t) = Taken By, (c) = Cosigned By    Initials Name Provider Type    PÉREZ Sainz, PT Physical Therapist                               PT OP Goals       09/18/17 0900       PT Short Term Goals    STG 1 Patient will demonstrate ability to activate core musculature in standing aquatic exercise to provide support to her lumbar spine and improve function.  -PÉREZ     STG 1 Progress Met  -PÉREZ     STG 2 Angie will demonstrate independence with initial program for core strength and aerobic conditioning.  -PÉREZ     STG 2 Progress Met  -PÉREZ     STG 3 Angie will demonstrate improved proximal strength and balance demonstrated by the 5 X sit to stand test from 15.63  seconds to 11  seconds.  -PÉREZ     STG 3 Progress Progressing  -PÉREZ     STG 4 Angie will report a decrease in her peak pain from 8/10 to 6/10.  -PÉREZ     STG 4 Progress Ongoing  -PÉREZ     STG 4 Progress Comments Had flare up last week that did nt allow her  to attend PT one day.  -PÉREZ     Long Term Goals    LTG 1 Through teach back method, patient to demonstrate knowledge of exercises to continue upon discharge from therapy to assist her in self management of her condition.  -PÉREZ     LTG 1 Progress Partially Met  -PÉREZ     LTG 1 Progress Comments Has good understanding of aquatic exercises, however would benefit from instructionin a gym program for strength (currently using gym equipment for cardio (treadmill and elliptical which cause her pain.  -ÉPREZ     LTG 2 Improve function demonstrated by improvement in Oswestry score from 64% to 54% or less.   -PÉREZ     LTG 2 Progress Progressing  -PÉREZ     LTG 3 Angie will report a decrease in her peak pain from 8/10 to 5/10 or less to  improve ease with daily activity.  -PÉREZ     LTG 3 Progress Ongoing  -PÉREZ     LTG 4 Angie will increase strength to perform full bridge.  -PÉREZ     LTG 4 Progress Partially Met  -PÉREZ       User Key  (r) = Recorded By, (t) = Taken By, (c) = Cosigned By    Initials Name Provider Type    PÉREZ Sainz, PT Physical Therapist                    Time Calculation:   Start Time: 0903  Stop Time: 0945    Therapy Charges for Today     Code Description Service Date Service Provider Modifiers Qty    09843636594 HC PT AQUATIC THERAPY EA 15 MIN 9/18/2017 Audie Sainz, PT GP 3                    Audie Sainz, PT  9/18/2017

## 2017-09-20 ENCOUNTER — HOSPITAL ENCOUNTER (OUTPATIENT)
Dept: PHYSICAL THERAPY | Facility: HOSPITAL | Age: 47
Setting detail: THERAPIES SERIES
Discharge: HOME | End: 2017-09-20

## 2017-09-20 DIAGNOSIS — M48.061 LUMBAR STENOSIS: ICD-10-CM

## 2017-09-20 DIAGNOSIS — M54.41 CHRONIC RIGHT-SIDED LOW BACK PAIN WITH RIGHT-SIDED SCIATICA: Primary | ICD-10-CM

## 2017-09-20 DIAGNOSIS — G89.29 CHRONIC RIGHT-SIDED LOW BACK PAIN WITH RIGHT-SIDED SCIATICA: Primary | ICD-10-CM

## 2017-09-20 DIAGNOSIS — M43.16 SPONDYLOLISTHESIS AT L4-L5 LEVEL: ICD-10-CM

## 2017-09-20 PROCEDURE — 97113 AQUATIC THERAPY/EXERCISES: CPT | Performed by: PHYSICAL THERAPIST

## 2017-09-20 NOTE — THERAPY PROGRESS REPORT/RE-CERT
Outpatient Physical Therapy Ortho Re-Assessment  [facility]     Patient Name: Angie Perera  : 1970  MRN: [id number]  Today's Date: 2017      Visit Date: 2017    Patient Active Problem List   Diagnosis   • Essential hypertension   • Localized edema   • Snoring   • Multiple joint pain   • History of Bell's palsy   • Depression   • Prediabetes   • Chronic gout   • Dietary counseling   • Constipation   • Lumbar spinal stenosis   • Body mass index (BMI) of 50-59.9 in adult        Past Medical History:   Diagnosis Date   • Back pain    • Depression    • Depression    • Edema    • Fatigue    • Fibromyalgia    • Gout    • H/o Lyme disease    • History of Bell's palsy     X3   • Hypertension    • Joint pain    • Prediabetes    • Spinal stenosis         Past Surgical History:   Procedure Laterality Date   • BREAST BIOPSY     • BREAST LUMPECTOMY Right    • DILATATION AND CURETTAGE     • ENDOSCOPY N/A 2016    Procedure: ESOPHAGOGASTRODUODENOSCOPY with biopsy for urease;  Surgeon: Sanchez Clark Jr., MD;  Location: [facility] ENDOSCOPY;  Service:    • GASTRIC SLEEVE LAPAROSCOPIC N/A 8/15/2016    Procedure: GASTRIC SLEEVE LAPAROSCOPIC;  Surgeon: Sanchez Clark Jr., MD;  Location: [facility] OR [facility] [place];  Service:    • TUBAL ABDOMINAL LIGATION         Visit Dx:     ICD-10-CM ICD-9-CM   1. Chronic right-sided low back pain with right-sided sciatica M54.41 724.2    G89.29 724.3     338.29   2. Spondylolisthesis at L4-L5 level M43.16 756.12   3. Lumbar stenosis M48.06 724.02                 PT Ortho       17 0900    Subjective Pain    Able to rate subjective pain? yes  -PÉREZ    Pre-Treatment Pain Level 5  -PÉREZ    Post-Treatment Pain Level 3  -PÉREZ    Myotomal Screen- Lower Quarter Clearing    Hip flexion (L2) Bilateral:;5 (Normal)  -PÉREZ    Knee extension (L3) Bilateral:;5 (Normal)  -PÉREZ    Ankle DF (L4) Bilateral:;5 (Normal)  -PÉREZ    Ankle PF (S1) Bilateral:;4+ (Good +)  -PÉREZ    Knee flexion (S2)  Right:;4+ (Good +);Left:;5 (Normal)  -PÉREZ    Lumbar ROM Screen- Lower Quarter Clearing    Lumbar Flexion Normal  -PÉREZ    Lumbar Extension Impaired   50% motion with pain  -PÉREZ    Lumbar Lateral Flexion Impaired   R WNL, L 75% motion  -PÉREZ    Lumbar Rotation Normal  -PÉREZ    MMT (Manual Muscle Testing)    General MMT Assessment Detail Able to partial bridge. Abdominals grossly 3+/5  -PÉREZ    Stairs Assessment/Treatment    Number of Stairs 13  -PÉREZ    Stairs, Comment Non-reciprocal pattern.  -PÉREZ      User Key  (r) = Recorded By, (t) = Taken By, (c) = Cosigned By    Initials Name Provider Type    PÉREZ Sainz, PT Physical Therapist                                PT OP Goals       09/20/17 0900       PT Short Term Goals    STG 1 Patient will demonstrate ability to activate core musculature in standing aquatic exercise to provide support to her lumbar spine and improve function.  -PÉREZ     STG 1 Progress Met  -PÉREZ     STG 2 Angie will demonstrate independence with initial program for core strength and aerobic conditioning.  -PÉREZ     STG 2 Progress Met  -PÉREZ     STG 3 Angie will demonstrate improved proximal strength and balance demonstrated by the 5 X sit to stand test from 15.63  seconds to 11  seconds.  -PÉREZ     STG 3 Progress Met  -PÉREZ     STG 3 Progress Comments 11.23 sec with hands on thighs.  -PÉREZ     STG 4 Angie will report a decrease in her peak pain from 8/10 to 6/10.  -PÉREZ     STG 4 Progress Progressing  -PÉREZ     STG 4 Progress Comments Peak pain is 7/10, current pain without use of medication is 5/10.  -PÉREZ     Long Term Goals    LTG 1 Through teach back method, patient to demonstrate knowledge of exercises to continue upon discharge from therapy to assist her in self management of her condition.  -PÉREZ     LTG 1 Progress Partially Met  -PÉREZ     LTG 1 Progress Comments Independent with prescribed aquatic exercise program personalized for her condition.  -PÉREZ     LTG 2 Improve function demonstrated by improvement in Oswestry  score from 64% to 54% or less.   -PÉREZ     LTG 2 Progress Met  -PÉREZ     LTG 2 Progress Comments GUSTABO 46%  -PÉREZ     LTG 3 Angie will report a decrease in her peak pain from 8/10 to 5/10 or less to improve ease with daily activity.  -PÉREZ     LTG 3 Progress Ongoing  -PÉREZ     LTG 4 Angie will increase strength to perform full bridge.  -PÉREZ     LTG 4 Progress Partially Met  -PÉREZ     LTG 4 Progress Comments Able to partial bridge.  -PÉREZ       User Key  (r) = Recorded By, (t) = Taken By, (c) = Cosigned By    Initials Name Provider Type    PÉREZ Sainz, PT Physical Therapist                PT Assessment/Plan       09/20/17 9200       PT Assessment    Functional Limitations Impaired gait;Limitations in functional capacity and performance  -PÉREZ     Impairments Pain;Muscle strength;Sensation;Range of motion  -PÉREZ     Assessment Comments Angie has been treated in aquatic therapy to instruct/revise aquatic exercise for her condition.  Prior to physical therapy Angie was exercising in the pool on her own which was causing her increased pain after.  With her current program, she no longer has increased pain, in fact her pain decreases to a 4/10 or less.  There has been significant improvement in the Oswestry score-improving from 64% to 46%, as well as the 5 X sit to stand which is now completed in 11.23 sec.  Functionally Angie is now using her rolling chair to complete household tasks at least 50% less of the time compared to her initial visit due to improved standing tolerance.  Angie reports a weight loss of 14.6 pounds in the past 6 weeks.  Angie reports she no longer requires her pain medicine first thing in the morning and is able to go without it until the early afternoon.  In addition she has applied for a job and is planning to return to work.  At this time it is felt that Angie would benefit from formal instruction in a gym program to compliment her aquatic program.   -PÉREZ     Please refer to paper survey for additional  self-reported information Yes  -PÉREZ     Rehab Potential Good  -PÉREZ     Patient/caregiver participated in establishment of treatment plan and goals Yes  -PÉREZ     Patient would benefit from skilled therapy intervention Yes  -PÉREZ     PT Plan    PT Frequency 2x/week  -PÉREZ     Predicted Duration of Therapy Intervention (days/wks) 3 weeks  -PÉREZ     Planned CPT's? PT THER PROC EA 15 MIN: 00130  -PÉREZ     PT Plan Comments Instruct in appropriate gym program.  -PÉREZ       User Key  (r) = Recorded By, (t) = Taken By, (c) = Cosigned By    Initials Name Provider Type    PÉREZ Sainz, PT Physical Therapist                  Exercises       09/20/17 0900          Subjective Pain    Able to rate subjective pain? yes  -PÉREZ      Pre-Treatment Pain Level 5  -PÉREZ      Post-Treatment Pain Level 3  -PÉREZ        User Key  (r) = Recorded By, (t) = Taken By, (c) = Cosigned By    Initials Name Provider Type    PÉREZ Sainz, VAIBHAV Physical Therapist                              Outcome Measures       09/20/17 0900          5 Times Sit to Stand    5 Times Sit to Stand (seconds) 11.23 seconds  -PÉREZ      5 Times Sit to Stand Comments Hands on thighs  -PÉREZ      Modified Oswestry    Modified Oswestry Score/Comments 46%  -PÉREZ        User Key  (r) = Recorded By, (t) = Taken By, (c) = Cosigned By    Initials Name Provider Type    PÉREZ Sainz PT Physical Therapist            Time Calculation:   Start Time: 0900  Stop Time: 0945  Time Calculation (min): 45 min     Therapy Charges for Today     Code Description Service Date Service Provider Modifiers Qty    74351567996 HC PT AQUATIC THERAPY EA 15 MIN 9/20/2017 Audie Sainz, PT GP 3                    Audie Sainz, PT  9/20/2017

## 2017-10-17 ENCOUNTER — OFFICE VISIT (OUTPATIENT)
Dept: PAIN MEDICINE | Facility: CLINIC | Age: 47
End: 2017-10-17

## 2017-10-17 VITALS
BODY MASS INDEX: 50.02 KG/M2 | TEMPERATURE: 96.6 F | HEIGHT: 64 IN | WEIGHT: 293 LBS | RESPIRATION RATE: 16 BRPM | DIASTOLIC BLOOD PRESSURE: 83 MMHG | HEART RATE: 77 BPM | OXYGEN SATURATION: 99 % | SYSTOLIC BLOOD PRESSURE: 123 MMHG

## 2017-10-17 DIAGNOSIS — M54.50 CHRONIC BILATERAL LOW BACK PAIN WITHOUT SCIATICA: ICD-10-CM

## 2017-10-17 DIAGNOSIS — G89.29 CHRONIC BILATERAL LOW BACK PAIN WITHOUT SCIATICA: ICD-10-CM

## 2017-10-17 DIAGNOSIS — M53.3 SACROILIAC JOINT DYSFUNCTION OF RIGHT SIDE: Primary | ICD-10-CM

## 2017-10-17 PROCEDURE — 80305 DRUG TEST PRSMV DIR OPT OBS: CPT | Performed by: PAIN MEDICINE

## 2017-10-17 PROCEDURE — 99213 OFFICE O/P EST LOW 20 MIN: CPT | Performed by: PAIN MEDICINE

## 2017-10-17 RX ORDER — HYDROCODONE BITARTRATE AND ACETAMINOPHEN 7.5; 325 MG/1; MG/1
1 TABLET ORAL 2 TIMES DAILY PRN
Qty: 60 TABLET | Refills: 0 | Status: SHIPPED | OUTPATIENT
Start: 2017-10-17

## 2017-10-17 NOTE — PROGRESS NOTES
CHIEF COMPLAINT: Back Pain    HPI  Angie Perera is a 47 y.o. female.  She is here to follow up for Back Pain    Since last visit their pain has improved. States she is done with PT and while she was doing it her pain improved. She is continuing some of the exercises she learned while there. States she is waiting to see if passport will pay for more visits. 9/20/2017 was last visit. Discharged with HEPL plan. Performs HEP 3 times a week. Started aquatic therapy at [facility].     The patient states their pain is a 6 on a scale of 1-10.  The patient describes this pain as constant dull and ache.  The pain is located in right low back and does radiate posterior right buttock and lateral aspect of right thigh, not past knee. This painful problem is aggravated by physical activity and walking, standing and is alleviated by pain medication and relaxation.    Past pain medications: hydrocodone 7.5/325 mg - tid - helping but became ineffective  Hydrocodone 10/325 mg tid- last dose was 9/2016  Prednisone dose pack- significant help  Morphine ER 30 mg bid - made her sick and itching- stopped after her Bariatric surgery.   Tylenol q8h prn      Current pain medications:   Norco 7.5/325 mg bid prn pain   Gabapentin 800 mg qid - helping  Flexeril 10 mg bid - no help with pain but helps her sleep      Past therapies:  Physical Therapy: yes- currently going to water therapy three times/week.   Chiropractor: yes  Massage Therapy: no  TENS: yes  Neck or back surgery: no      Previous Injection: right knee injection - 1/16/2017  Effect of Injection (%): minimal help      Previous Injections: yes, total of 7 LESIs, most recently at [facility] in 2011  Effect of Injection (%): minimal help  Length of Relief: couple weeks     PEG Assessment   What number best describes your pain on average in the past week? 6  What number best describes how, during the past week, pain has interfered with your enjoyment of life? 6  What  number best describes how, during the past week, pain has interfered with your general activity? 6      Current Outpatient Prescriptions:   •  allopurinol (ZYLOPRIM) 300 MG tablet, Take 300 mg by mouth every morning., Disp: , Rfl:   •  amLODIPine (NORVASC) 5 MG tablet, TK 1 T PO QD FOR BP, Disp: , Rfl: 1  •  Calcium Carbonate-Vit D-Min (CALCIUM 1200 PO), Take 1,200 mg by mouth 2 (two) times a day., Disp: , Rfl:   •  carvedilol (COREG) 12.5 MG tablet, TK 1 T PO  D, Disp: , Rfl: 5  •  Cholecalciferol (VITAMIN D3) 5000 UNITS capsule capsule, Take 5,000 Units by mouth Daily., Disp: , Rfl:   •  cyclobenzaprine (FLEXERIL) 10 MG tablet, TAKE 1 TABLET BY MOUTH THREE TIMES DAILY AS NEEDED FOR MUSCLE SPASMS, Disp: 90 tablet, Rfl: 2  •  fluticasone (FLONASE) 50 MCG/ACT nasal spray, SHAKE WELL AND U 1 SPR IEN QAM AND QHS, Disp: , Rfl: 11  •  furosemide (LASIX) 40 MG tablet, Take 40 mg by mouth every morning., Disp: , Rfl:   •  gabapentin (NEURONTIN) 800 MG tablet, TAKE 1 TABLET BY MOUTH FOUR TIMES DAILY, Disp: 120 tablet, Rfl: 5  •  HYDROcodone-acetaminophen (NORCO) 7.5-325 MG per tablet, Take 1 tablet by mouth 2 (Two) Times a Day As Needed (pain)., Disp: 60 tablet, Rfl: 0  •  VENTOLIN  (90 BASE) MCG/ACT inhaler, INHALE 2 PUFFS PO QID PRF BREATHING, Disp: , Rfl: 2  •  HYDROcodone-acetaminophen (NORCO) 7.5-325 MG per tablet, Take 1 tablet by mouth 2 (Two) Times a Day As Needed (pain)., Disp: 60 tablet, Rfl: 0    IMAGING  lumbar MRI 11/26/2014:  Impression:  1. At L4/5, there is spondylolyses and listhesis which is mildly increased from the prior exam. Broad-based protruding disc is also increased from the prior exam and there is a dorsal right paracentral epidural fluid collection as described above that is probably represent a synovial or ligamentum flavum cyst. The combination of finding results in relatively severe spinal stenosis which is increased from the prior exam as well as severe left and moderately severe right  "foraminal stenosis and impingement of the L4 nerve root bilaterally.  2. At L5-S1 there is a 2-3 mm central and right paracentral disc bulge which appears stable.  This lumbar MRI report was completed at [facility] and will be scanned into her permanent record.    Imaging last reviewed: 10/17/17     PFSH:  The following portions of the patient's history were reviewed and updated as appropriate: problem list, past medical history, past surgery history, social history, family history, medications, and allergies    Review of Systems   Constitutional: Positive for appetite change (decreased). Negative for activity change, chills and fever.   HENT: Negative for ear discharge and ear pain.    Eyes: Negative for pain.   Respiratory: Negative for apnea, cough, shortness of breath and wheezing.    Cardiovascular: Positive for leg swelling. Negative for chest pain and palpitations.   Gastrointestinal: Negative for abdominal pain, constipation, diarrhea, nausea and vomiting.   Genitourinary: Negative for difficulty urinating.   Musculoskeletal: Positive for back pain.   Neurological: Positive for numbness (down leg). Negative for dizziness, weakness, light-headedness and headaches.   Psychiatric/Behavioral: Negative for agitation, confusion, hallucinations, sleep disturbance (pt states she is on melatonin) and suicidal ideas. The patient is not nervous/anxious.    All other systems reviewed and are negative.      Vitals:    10/17/17 0923   BP: 123/83   Pulse: 77   Resp: 16   Temp: 96.6 °F (35.9 °C)   SpO2: 99%   Weight: 297 lb 12.8 oz (135 kg)   Height: 64\" (162.6 cm)   PainSc:   6   PainLoc: Back  Comment: and hip       Physical Exam   Constitutional: She is oriented to person, place, and time. She appears well-developed and well-nourished. No distress.   HENT:   Head: Normocephalic and atraumatic.   Nose: Nose normal.   Mouth/Throat: Oropharynx is clear and moist.   Eyes: Conjunctivae and EOM are normal.   Neck: " Normal range of motion. Neck supple.   Pulmonary/Chest: Effort normal. No stridor. No respiratory distress.   Abdominal: Soft. There is no tenderness. There is no rebound and no guarding.   Musculoskeletal:        Right hip: She exhibits decreased range of motion (due to size) and decreased strength. She exhibits no tenderness and no swelling.        Left hip: She exhibits decreased range of motion (due to size). She exhibits normal strength and no tenderness.        Lumbar back: She exhibits decreased range of motion (due to size). She exhibits no tenderness, no bony tenderness and no pain.   Neurological: She is alert and oriented to person, place, and time. She has normal strength. No cranial nerve deficit or sensory deficit.   Skin: Skin is warm and dry. No rash noted. She is not diaphoretic.   Psychiatric: She has a normal mood and affect. Her speech is normal and behavior is normal.   Nursing note and vitals reviewed.    Right Hip Exam     Tenderness   The patient is experiencing tenderness in the posterior.    Tests   HOMER: positive      Left Hip Exam     Tests   HOMER: negative          Neurologic Exam     Mental Status   Oriented to person, place, and time.   Speech: speech is normal     Cranial Nerves     CN III, IV, VI   Extraocular motions are normal.     Motor Exam     Strength   Strength 5/5 throughout.       Lab Results   Component Value Date    POCMETH Negative 10/17/2017    POCAMPHET Negative 10/17/2017    POCBARBITUR Negative 10/17/2017    POCBENZO Negative 10/17/2017    POCCOCAINE Negative 10/17/2017    POCMETHADO Negative 10/17/2017    POCOPIATES Positive (A) 10/17/2017    POCOXYCODO Negative 10/17/2017    POCPHENCYC Negative 10/17/2017    POCPROPOXY Negative 10/17/2017    POCTHC Negative 10/17/2017    POCTRICYC Negative 10/17/2017     Last UDS results reviewed: 10/17/17   Last UDS: 7/24/17      Date of last ASAEL reviewed : 10/17/17   Comments: Consistent     Assessment/Plan   Angie was seen  today for back pain.    Diagnoses and all orders for this visit:    Sacroiliac joint dysfunction of right side  -     HYDROcodone-acetaminophen (NORCO) 7.5-325 MG per tablet; Take 1 tablet by mouth 2 (Two) Times a Day As Needed (pain).  -     HYDROcodone-acetaminophen (NORCO) 7.5-325 MG per tablet; Take 1 tablet by mouth 2 (Two) Times a Day As Needed (pain).  -     Case Request  -     Urine Drug Screen Confirmation - Urine, Urine, Clean Catch  -     POC Urine Drug Screen, Triage    Chronic bilateral low back pain without sciatica  -     Urine Drug Screen Confirmation - Urine, Urine, Clean Catch  -     POC Urine Drug Screen, Triage      Requested Prescriptions     Signed Prescriptions Disp Refills   • HYDROcodone-acetaminophen (NORCO) 7.5-325 MG per tablet 60 tablet 0     Sig: Take 1 tablet by mouth 2 (Two) Times a Day As Needed (pain).   • HYDROcodone-acetaminophen (NORCO) 7.5-325 MG per tablet 60 tablet 0     Sig: Take 1 tablet by mouth 2 (Two) Times a Day As Needed (pain).     - Discussed with the patient regarding the etiology of their pain. Informed them that they would likely benefit from a right SI joint injection. The procedure was described in detail and the risks, benefits and alternatives were discussed with the patient (including but not limited to: bleeding, infection, nerve damage, worsening of pain, inability to perform injection, paralysis, seizures, and death) who agreed to proceed.   - CONTINUE norco at 7.5 mg bid prn for pain. Needs refill in two weeks, will give today. Follow up in 6 weeks when next refill is needed. (#60, 1 refills)  Appropriate DNF dates applied.   - Will perform two injections approx 1 month apart.   - Last UDS performed was reviewed and consistent.  Random urine drug screen per office policy today, to be checked at next visit.  Given history of discharge for abnormal UDS - will monitor closely.   - Takes metamucil, increased water intake, stool softener daily and eats  vegatable to help with constipation. Continue bowel regimen for chronic constipation.  - She is on a significantly lower dose of narcotic medication than she was approx 1 year ago when she was on morphine bid as well as norco tid. Will continue her on her lower dose as it is effective and will not increase her back up to her high dose she was previously on.   - Patient is to continue to lose weight and along with her multimodal regimen hopes to wean off his narcotic medication in the future.  - Continue Gabapentin at current dose of 800 mg qid as it is currently helping. No refills needed today.   - Continue flexeril 10 mg tid prn as it is currently helping.   - Given poor response to injections in the past will not plan on repeating epidurals at this time.   - States she will consider spine surgery after weight loss or if pain/numbness/weakness worsens given lumbar spinal stenosis seen on MRI. We will hold off for now.  - Continue home exercise program.      Wt Readings from Last 3 Encounters:   10/17/17 297 lb 12.8 oz (135 kg)   09/07/17 (!) 301 lb (137 kg)   08/18/17 (!) 303 lb (137 kg)     Body mass index is 51.12 kg/(m^2). By CDC definitions, this patient is obese (BMI >= 30).   weight loss of 6 lbs over the past 2 month(s)    Intentional?  Yes Patient counseled on the importance of weight loss to help with overall health and pain control. Patient instructed to attempt weight loss.   Plan: Calorie counting, water aerobics  reduce portion size, cut out extra servings and reduce fast food intake started PT, loose another #7 over next 2 months.     Follow-up in 2 months.    Shanda Dye MD  Pain Management      [facility] REPORT  As part of the patient's treatment plan, I am prescribing controlled substances. The patient has been made aware of appropriate use of such medications, including potential risk of somnolence, limited ability to drive and/or work safely, and the potential for dependence or overdose.  It has also bee made clear that these medications are for use by this patient only, without concomitant use of alcohol or other substances unless prescribed.   Patient has completed prescribing agreement detailing terms of continued prescribing of controlled substances, including monitoring ASAEL reports, urine drug screening, and pill counts if necessary. The patient is aware that inappropriate use will results in cessation of prescribing such medications.  ASAEL report has been reviewed and scanned into the patient's chart.  History and physical exam exhibit continued safe and appropriate use of controlled substances.

## 2017-11-08 DIAGNOSIS — M25.50 MULTIPLE JOINT PAIN: ICD-10-CM

## 2017-11-08 RX ORDER — CYCLOBENZAPRINE HCL 10 MG
TABLET ORAL
Qty: 90 TABLET | Refills: 3 | Status: SHIPPED | OUTPATIENT
Start: 2017-11-08

## 2017-11-14 ENCOUNTER — DOCUMENTATION (OUTPATIENT)
Dept: PAIN MEDICINE | Facility: CLINIC | Age: 47
End: 2017-11-14

## 2017-11-16 ENCOUNTER — TELEPHONE (OUTPATIENT)
Dept: PAIN MEDICINE | Facility: CLINIC | Age: 47
End: 2017-11-16

## 2017-11-16 NOTE — TELEPHONE ENCOUNTER
----- Message from Shanda Dye MD sent at 10/23/2017  1:55 PM EDT -----  Regarding: random UDS and pill count  Please call patient for a random UDS with pill count on Monday 11/13/2017.   Thank you.           Spoke with patient and she states she will be able to come by tomorrow after she has her injection by Dr. Yoon.    11/15/17 @ 8:51 am LM for patient that she did not come in for her UDS/Pill count as she said she would and she was a no show for her procedure yesterday. Explained she needs to be here today or it could jeopardize any refills on medication for her.    PÉREZ

## 2018-06-04 ENCOUNTER — DOCUMENTATION (OUTPATIENT)
Dept: PHYSICAL THERAPY | Facility: HOSPITAL | Age: 48
End: 2018-06-04

## 2018-06-04 DIAGNOSIS — M54.41 CHRONIC RIGHT-SIDED LOW BACK PAIN WITH RIGHT-SIDED SCIATICA: Primary | ICD-10-CM

## 2018-06-04 DIAGNOSIS — G89.29 CHRONIC RIGHT-SIDED LOW BACK PAIN WITH RIGHT-SIDED SCIATICA: Primary | ICD-10-CM

## 2018-06-04 DIAGNOSIS — M43.16 SPONDYLOLISTHESIS AT L4-L5 LEVEL: ICD-10-CM

## 2018-06-04 NOTE — THERAPY DISCHARGE NOTE
Outpatient Physical Therapy Discharge Summary         Patient Name: Angie Perera  : 1970  MRN: [id number]    Today's Date: 2018    Visit Dx:    ICD-10-CM ICD-9-CM   1. Chronic right-sided low back pain with right-sided sciatica M54.41 724.2    G89.29 724.3     338.29   2. Spondylolisthesis at L4-L5 level M43.16 756.12             PT OP Goals     Row Name 18 1100          PT Short Term Goals    STG 1 Patient will demonstrate ability to activate core musculature in standing aquatic exercise to provide support to her lumbar spine and improve function.  -PÉREZ     STG 1 Progress Met  -PÉREZ     STG 2 Angie will demonstrate independence with initial program for core strength and aerobic conditioning.  -PÉREZ     STG 2 Progress Met  -PÉREZ     STG 3 Angie will demonstrate improved proximal strength and balance demonstrated by the 5 X sit to stand test from 15.63  seconds to 11  seconds.  -PÉREZ     STG 3 Progress Met  -PÉREZ     STG 4 Angie will report a decrease in her peak pain from 8/10 to 6/10.  -PÉREZ     STG 4 Progress Not Met  -PÉREZ        Long Term Goals    LTG 1 Through teach back method, patient to demonstrate knowledge of exercises to continue upon discharge from therapy to assist her in self management of her condition.  -PÉREZ     LTG 1 Progress Partially Met  -PÉREZ     LTG 2 Improve function demonstrated by improvement in Oswestry score from 64% to 54% or less.   -PÉREZ     LTG 2 Progress Met  -PÉREZ     LTG 3 Angie will report a decrease in her peak pain from 8/10 to 5/10 or less to improve ease with daily activity.  -PÉREZ     LTG 3 Progress Not Met  -PÉREZ     LTG 4 Angie will increase strength to perform full bridge.  -PÉREZ     LTG 4 Progress Partially Met  -PÉREZ       User Key  (r) = Recorded By, (t) = Taken By, (c) = Cosigned By    Initials Name Provider Type    PÉREZ Sainz, PT Physical Therapist          OP PT Discharge Summary  Date of Discharge: 10/25/17  Reason for Discharge: Unable to pay/insurance  denied care  Outcomes Achieved: Patient able to partially acheive established goals  Discharge Destination: Home with home program  Discharge Instructions/Additional Comments: Angie made good progress with aquatic PT, increasing aerobic training in pool with walking, finning and cycling in addition to core strengthening.  She also exercises at the [facility].      Time Calculation:                    Audie Sainz, PT  6/4/2018

## 2021-03-24 ENCOUNTER — EXTERNAL LAB (OUTPATIENT)
Dept: OTHER | Age: 51
End: 2021-03-24

## 2021-03-24 LAB
LAB RESULT: NORMAL

## 2021-05-10 ENCOUNTER — LAB SERVICES (OUTPATIENT)
Dept: LAB | Age: 51
End: 2021-05-10

## 2021-05-10 DIAGNOSIS — Z11.52 ENCOUNTER FOR PREPROCEDURE SCREENING LABORATORY TESTING FOR COVID-19: ICD-10-CM

## 2021-05-10 DIAGNOSIS — Z01.812 ENCOUNTER FOR PREPROCEDURE SCREENING LABORATORY TESTING FOR COVID-19: ICD-10-CM

## 2021-05-10 LAB
SARS-COV-2 RNA RESP QL NAA+PROBE: NOT DETECTED
SERVICE CMNT-IMP: NORMAL
SERVICE CMNT-IMP: NORMAL

## 2021-05-10 PROCEDURE — U0003 INFECTIOUS AGENT DETECTION BY NUCLEIC ACID (DNA OR RNA); SEVERE ACUTE RESPIRATORY SYNDROME CORONAVIRUS 2 (SARS-COV-2) (CORONAVIRUS DISEASE [COVID-19]), AMPLIFIED PROBE TECHNIQUE, MAKING USE OF HIGH THROUGHPUT TECHNOLOGIES AS DESCRIBED BY CMS-2020-01-R: HCPCS | Performed by: OBSTETRICS & GYNECOLOGY

## 2021-05-10 PROCEDURE — U0005 INFEC AGEN DETEC AMPLI PROBE: HCPCS | Performed by: OBSTETRICS & GYNECOLOGY

## 2021-05-11 RX ORDER — DULOXETIN HYDROCHLORIDE 30 MG/1
30 CAPSULE, DELAYED RELEASE ORAL 2 TIMES DAILY
COMMUNITY

## 2021-05-11 RX ORDER — AMLODIPINE BESYLATE 10 MG/1
10 TABLET ORAL DAILY
COMMUNITY

## 2021-05-11 RX ORDER — FUROSEMIDE 40 MG/1
40 TABLET ORAL DAILY
COMMUNITY

## 2021-05-12 ENCOUNTER — ANESTHESIA EVENT (OUTPATIENT)
Dept: SURGERY | Age: 51
End: 2021-05-12

## 2021-05-12 ENCOUNTER — HOSPITAL ENCOUNTER (OUTPATIENT)
Age: 51
Discharge: HOME | End: 2021-05-12
Attending: OBSTETRICS & GYNECOLOGY | Admitting: OBSTETRICS & GYNECOLOGY

## 2021-05-12 ENCOUNTER — ANESTHESIA (OUTPATIENT)
Dept: SURGERY | Age: 51
End: 2021-05-12

## 2021-05-12 VITALS
SYSTOLIC BLOOD PRESSURE: 115 MMHG | HEART RATE: 70 BPM | BODY MASS INDEX: 44.73 KG/M2 | HEIGHT: 64 IN | DIASTOLIC BLOOD PRESSURE: 71 MMHG | WEIGHT: 262 LBS | OXYGEN SATURATION: 99 % | TEMPERATURE: 97.3 F | RESPIRATION RATE: 16 BRPM

## 2021-05-12 DIAGNOSIS — N85.00 ENDOMETRIAL HYPERPLASIA, UNSPECIFIED: ICD-10-CM

## 2021-05-12 PROCEDURE — 10004452 HB PACU ADDL 30 MINUTES: Performed by: OBSTETRICS & GYNECOLOGY

## 2021-05-12 PROCEDURE — 88305 TISSUE EXAM BY PATHOLOGIST: CPT | Performed by: OBSTETRICS & GYNECOLOGY

## 2021-05-12 PROCEDURE — 13000036 HB COMPLEX  CASE S/U + 1ST 15 MIN: Performed by: OBSTETRICS & GYNECOLOGY

## 2021-05-12 PROCEDURE — 10002800 HB RX 250 W HCPCS: Performed by: ANESTHESIOLOGY

## 2021-05-12 PROCEDURE — 13000002 HB ANESTHESIA  GENERAL  S/U + 1ST 15 MIN: Performed by: OBSTETRICS & GYNECOLOGY

## 2021-05-12 PROCEDURE — 13000037 HB COMPLEX CASE EACH ADD MINUTE: Performed by: OBSTETRICS & GYNECOLOGY

## 2021-05-12 PROCEDURE — 10002801 HB RX 250 W/O HCPCS: Performed by: ANESTHESIOLOGY

## 2021-05-12 PROCEDURE — 13000001 HB PHASE II RECOVERY EA 30 MINUTES: Performed by: OBSTETRICS & GYNECOLOGY

## 2021-05-12 PROCEDURE — 13000003 HB ANESTHESIA  GENERAL EA ADD MINUTE: Performed by: OBSTETRICS & GYNECOLOGY

## 2021-05-12 PROCEDURE — 10002807 HB RX 258: Performed by: OBSTETRICS & GYNECOLOGY

## 2021-05-12 PROCEDURE — 10004451 HB PACU RECOVERY 1ST 30 MINUTES: Performed by: OBSTETRICS & GYNECOLOGY

## 2021-05-12 PROCEDURE — 10006023 HB SUPPLY 272: Performed by: OBSTETRICS & GYNECOLOGY

## 2021-05-12 RX ORDER — SODIUM CHLORIDE, SODIUM LACTATE, POTASSIUM CHLORIDE, CALCIUM CHLORIDE 600; 310; 30; 20 MG/100ML; MG/100ML; MG/100ML; MG/100ML
INJECTION, SOLUTION INTRAVENOUS CONTINUOUS
Status: DISCONTINUED | OUTPATIENT
Start: 2021-05-12 | End: 2021-05-12 | Stop reason: HOSPADM

## 2021-05-12 RX ORDER — PROPOFOL 10 MG/ML
INJECTION, EMULSION INTRAVENOUS PRN
Status: DISCONTINUED | OUTPATIENT
Start: 2021-05-12 | End: 2021-05-12

## 2021-05-12 RX ORDER — ONDANSETRON 2 MG/ML
4 INJECTION INTRAMUSCULAR; INTRAVENOUS 2 TIMES DAILY PRN
Status: DISCONTINUED | OUTPATIENT
Start: 2021-05-12 | End: 2021-05-12 | Stop reason: HOSPADM

## 2021-05-12 RX ORDER — HYDRALAZINE HYDROCHLORIDE 20 MG/ML
5 INJECTION INTRAMUSCULAR; INTRAVENOUS EVERY 10 MIN PRN
Status: DISCONTINUED | OUTPATIENT
Start: 2021-05-12 | End: 2021-05-12 | Stop reason: HOSPADM

## 2021-05-12 RX ORDER — LIDOCAINE HYDROCHLORIDE 20 MG/ML
INJECTION, SOLUTION INFILTRATION; PERINEURAL PRN
Status: DISCONTINUED | OUTPATIENT
Start: 2021-05-12 | End: 2021-05-12

## 2021-05-12 RX ORDER — MIDAZOLAM HYDROCHLORIDE 1 MG/ML
INJECTION, SOLUTION INTRAMUSCULAR; INTRAVENOUS PRN
Status: DISCONTINUED | OUTPATIENT
Start: 2021-05-12 | End: 2021-05-12

## 2021-05-12 RX ORDER — DEXAMETHASONE SODIUM PHOSPHATE 4 MG/ML
INJECTION, SOLUTION INTRA-ARTICULAR; INTRALESIONAL; INTRAMUSCULAR; INTRAVENOUS; SOFT TISSUE PRN
Status: DISCONTINUED | OUTPATIENT
Start: 2021-05-12 | End: 2021-05-12

## 2021-05-12 RX ADMIN — SODIUM CHLORIDE, POTASSIUM CHLORIDE, SODIUM LACTATE AND CALCIUM CHLORIDE: 600; 310; 30; 20 INJECTION, SOLUTION INTRAVENOUS at 06:39

## 2021-05-12 RX ADMIN — DEXAMETHASONE SODIUM PHOSPHATE 4 MG: 4 INJECTION, SOLUTION INTRAMUSCULAR; INTRAVENOUS at 08:02

## 2021-05-12 RX ADMIN — FENTANYL CITRATE 100 MCG: 50 INJECTION, SOLUTION INTRAMUSCULAR; INTRAVENOUS at 07:44

## 2021-05-12 RX ADMIN — MIDAZOLAM HYDROCHLORIDE 2 MG: 1 INJECTION, SOLUTION INTRAMUSCULAR; INTRAVENOUS at 07:43

## 2021-05-12 RX ADMIN — PROPOFOL 200 MG: 10 INJECTION, EMULSION INTRAVENOUS at 07:44

## 2021-05-12 RX ADMIN — LIDOCAINE HYDROCHLORIDE 5 ML: 20 INJECTION, SOLUTION INFILTRATION; PERINEURAL at 07:44

## 2021-05-12 ASSESSMENT — ACTIVITIES OF DAILY LIVING (ADL)
ADL_SCORE: 12
HISTORY OF FALLING IN THE LAST YEAR (PRIOR TO ADMISSION): NO
ADL_SHORT_OF_BREATH: NO
RECENT_DECLINE_ADL: NO
NEEDS_ASSIST: NO
ADL_BEFORE_ADMISSION: INDEPENDENT
CHRONIC_PAIN_PRESENT: NO

## 2021-05-12 ASSESSMENT — PAIN SCALES - GENERAL
PAINLEVEL_OUTOF10: 0

## 2021-05-12 ASSESSMENT — COGNITIVE AND FUNCTIONAL STATUS - GENERAL
ARE YOU DEAF OR DO YOU HAVE SERIOUS DIFFICULTY  HEARING: NO
ARE YOU BLIND OR DO YOU HAVE SERIOUS DIFFICULTY SEEING, EVEN WHEN WEARING GLASSES: NO

## 2021-05-14 LAB
ASR DISCLAIMER: NORMAL
CASE RPRT: NORMAL
CLINICAL INFO: NORMAL
PATH REPORT.FINAL DX SPEC: NORMAL
PATH REPORT.FINAL DX SPEC: NORMAL
PATH REPORT.GROSS SPEC: NORMAL

## 2021-07-12 ENCOUNTER — OFFICE VISIT (OUTPATIENT)
Dept: UROGYNECOLOGY | Age: 51
End: 2021-07-12

## 2021-07-12 VITALS
SYSTOLIC BLOOD PRESSURE: 133 MMHG | DIASTOLIC BLOOD PRESSURE: 87 MMHG | HEART RATE: 95 BPM | WEIGHT: 293 LBS | HEIGHT: 64 IN | TEMPERATURE: 95.8 F | BODY MASS INDEX: 50.02 KG/M2

## 2021-07-12 DIAGNOSIS — N92.1 MENORRHAGIA WITH IRREGULAR CYCLE: Primary | ICD-10-CM

## 2021-07-12 DIAGNOSIS — N85.01 COMPLEX ENDOMETRIAL HYPERPLASIA: ICD-10-CM

## 2021-07-12 DIAGNOSIS — N95.1 MENOPAUSAL SYMPTOMS: ICD-10-CM

## 2021-07-12 LAB
APPEARANCE, POC: CLEAR
BILIRUBIN, POC: NEGATIVE
COLOR, POC: YELLOW
GLUCOSE UR-MCNC: NEGATIVE MG/DL
KETONES, POC: ABNORMAL MG/DL
NITRITE, POC: NEGATIVE
OCCULT BLOOD, POC: NEGATIVE
PH UR: 6 [PH] (ref 5–7)
PROT UR-MCNC: NEGATIVE MG/DL
SP GR UR: 1.02 (ref 1–1.03)
UROBILINOGEN UR-MCNC: 1 MG/DL (ref 0–1)
WBC (LEUKOCYTE) ESTERASE, POC: ABNORMAL

## 2021-07-12 PROCEDURE — 99244 OFF/OP CNSLTJ NEW/EST MOD 40: CPT | Performed by: OBSTETRICS & GYNECOLOGY

## 2021-07-12 PROCEDURE — 81003 URINALYSIS AUTO W/O SCOPE: CPT | Performed by: OBSTETRICS & GYNECOLOGY

## 2021-07-12 ASSESSMENT — ENCOUNTER SYMPTOMS
DEPRESSION: 0
EYE REDNESS: 0
CHILLS: 0
BACK PAIN: 0
BRUISES/BLEEDS EASILY: 0
BLURRED VISION: 0
SINUS PAIN: 0
FEVER: 0
WEIGHT LOSS: 0
SORE THROAT: 0
ROS GI COMMENTS: PER HPI
DIZZINESS: 0
WHEEZING: 0
SPEECH CHANGE: 0
COUGH: 0

## 2021-07-12 ASSESSMENT — LIFESTYLE VARIABLES: SUBSTANCE_ABUSE: 0

## 2021-08-17 DIAGNOSIS — N92.0 EXCESSIVE AND FREQUENT MENSTRUATION WITH REGULAR CYCLE: Primary | ICD-10-CM

## 2021-09-09 ENCOUNTER — OFFICE VISIT (OUTPATIENT)
Dept: MATERNAL FETAL MEDICINE | Age: 51
End: 2021-09-09
Attending: OBSTETRICS & GYNECOLOGY

## 2021-09-09 DIAGNOSIS — N84.0 POLYP OF CORPUS UTERI: Primary | ICD-10-CM

## 2021-09-09 DIAGNOSIS — N92.1 MENORRHAGIA WITH IRREGULAR CYCLE: ICD-10-CM

## 2021-09-09 DIAGNOSIS — N80.00 UTERUS, ADENOMYOSIS: ICD-10-CM

## 2021-09-09 PROCEDURE — 58340 CATHETER FOR HYSTEROGRAPHY: CPT | Performed by: OBSTETRICS & GYNECOLOGY

## 2021-09-09 PROCEDURE — 76377 3D RENDER W/INTRP POSTPROCES: CPT | Performed by: OBSTETRICS & GYNECOLOGY

## 2021-09-09 PROCEDURE — 76831 ECHO EXAM UTERUS: CPT | Performed by: OBSTETRICS & GYNECOLOGY

## 2021-12-01 ENCOUNTER — TELEPHONE (OUTPATIENT)
Dept: UROGYNECOLOGY | Age: 51
End: 2021-12-01

## 2021-12-10 ENCOUNTER — APPOINTMENT (OUTPATIENT)
Dept: UROGYNECOLOGY | Age: 51
End: 2021-12-10

## 2022-07-29 NOTE — TELEPHONE ENCOUNTER
No we have not Melolabial Interpolation Flap Text: A decision was made to reconstruct the defect utilizing an interpolation axial flap and a staged reconstruction.  A telfa template was made of the defect.  This telfa template was then used to outline the melolabial interpolation flap.  The donor area for the pedicle flap was then injected with anesthesia.  The flap was excised through the skin and subcutaneous tissue down to the layer of the underlying musculature.  The pedicle flap was carefully excised within this deep plane to maintain its blood supply.  The edges of the donor site were undermined.   The donor site was closed in a primary fashion.  The pedicle was then rotated into position and sutured.  Once the tube was sutured into place, adequate blood supply was confirmed with blanching and refill.  The pedicle was then wrapped with xeroform gauze and dressed appropriately with a telfa and gauze bandage to ensure continued blood supply and protect the attached pedicle.

## 2023-01-01 ENCOUNTER — EXTERNAL RECORD (OUTPATIENT)
Dept: HEALTH INFORMATION MANAGEMENT | Age: 53
End: 2023-01-01

## 2023-01-23 ENCOUNTER — EXTERNAL LAB (OUTPATIENT)
Dept: OTHER | Age: 53
End: 2023-01-23

## 2023-01-23 LAB
LAB RESULT: NORMAL
LAB RESULT: NORMAL

## 2023-07-25 ENCOUNTER — EXTERNAL LAB (OUTPATIENT)
Dept: OTHER | Age: 53
End: 2023-07-25

## 2023-07-25 LAB — LAB RESULT: NORMAL

## 2023-07-31 ENCOUNTER — ANESTHESIA EVENT (OUTPATIENT)
Dept: SURGERY | Age: 53
End: 2023-07-31

## 2023-07-31 RX ORDER — MELOXICAM 15 MG/1
15 TABLET ORAL DAILY
COMMUNITY

## 2023-07-31 ASSESSMENT — ACTIVITIES OF DAILY LIVING (ADL)
SENSORY_SUPPORT_DEVICES: EYEGLASSES
HISTORY OF FALLING IN THE LAST YEAR (PRIOR TO ADMISSION): NO
ADL_SHORT_OF_BREATH: NO
ADL_SCORE: 12
RECENT_DECLINE_ADL: NO
ADL_BEFORE_ADMISSION: INDEPENDENT
NEEDS_ASSIST: NO

## 2023-07-31 ASSESSMENT — COGNITIVE AND FUNCTIONAL STATUS - GENERAL: ARE YOU BLIND OR DO YOU HAVE SERIOUS DIFFICULTY SEEING, EVEN WHEN WEARING GLASSES: NO

## 2023-08-01 ENCOUNTER — HOSPITAL ENCOUNTER (OUTPATIENT)
Age: 53
Discharge: HOME | End: 2023-08-01
Attending: OBSTETRICS & GYNECOLOGY | Admitting: OBSTETRICS & GYNECOLOGY

## 2023-08-01 ENCOUNTER — ANESTHESIA (OUTPATIENT)
Dept: SURGERY | Age: 53
End: 2023-08-01

## 2023-08-01 ENCOUNTER — E-ADVICE (OUTPATIENT)
Dept: OBGYN | Age: 53
End: 2023-08-01

## 2023-08-01 VITALS
HEART RATE: 69 BPM | RESPIRATION RATE: 24 BRPM | SYSTOLIC BLOOD PRESSURE: 133 MMHG | BODY MASS INDEX: 51.91 KG/M2 | WEIGHT: 293 LBS | DIASTOLIC BLOOD PRESSURE: 98 MMHG | TEMPERATURE: 97.5 F | OXYGEN SATURATION: 98 % | HEIGHT: 63 IN

## 2023-08-01 DIAGNOSIS — N93.9 ABNORMAL UTERINE AND VAGINAL BLEEDING, UNSPECIFIED: ICD-10-CM

## 2023-08-01 LAB
B-HCG UR QL: NEGATIVE
INTERNAL PROCEDURAL CONTROLS ACCEPTABLE: YES
TEST LOT EXPIRATION DATE: NORMAL
TEST LOT NUMBER: NORMAL

## 2023-08-01 PROCEDURE — 81025 URINE PREGNANCY TEST: CPT | Performed by: OBSTETRICS & GYNECOLOGY

## 2023-08-01 PROCEDURE — 10002801 HB RX 250 W/O HCPCS

## 2023-08-01 PROCEDURE — 88342 IMHCHEM/IMCYTCHM 1ST ANTB: CPT | Performed by: OBSTETRICS & GYNECOLOGY

## 2023-08-01 PROCEDURE — 13000036 HB COMPLEX  CASE S/U + 1ST 15 MIN: Performed by: OBSTETRICS & GYNECOLOGY

## 2023-08-01 PROCEDURE — 10004451 HB PACU RECOVERY 1ST 30 MINUTES: Performed by: OBSTETRICS & GYNECOLOGY

## 2023-08-01 PROCEDURE — 13000037 HB COMPLEX CASE EACH ADD MINUTE: Performed by: OBSTETRICS & GYNECOLOGY

## 2023-08-01 PROCEDURE — 10002800 HB RX 250 W HCPCS

## 2023-08-01 PROCEDURE — 13000001 HB PHASE II RECOVERY EA 30 MINUTES: Performed by: OBSTETRICS & GYNECOLOGY

## 2023-08-01 PROCEDURE — 13000002 HB ANESTHESIA  GENERAL  S/U + 1ST 15 MIN: Performed by: OBSTETRICS & GYNECOLOGY

## 2023-08-01 PROCEDURE — 10006023 HB SUPPLY 272: Performed by: OBSTETRICS & GYNECOLOGY

## 2023-08-01 PROCEDURE — 10004452 HB PACU ADDL 30 MINUTES: Performed by: OBSTETRICS & GYNECOLOGY

## 2023-08-01 PROCEDURE — 13000035 HB BASIC CASE EA ADD MINUTE: Performed by: OBSTETRICS & GYNECOLOGY

## 2023-08-01 PROCEDURE — 13000034 HB BASIC CASE  S/U +1ST 15 MIN: Performed by: OBSTETRICS & GYNECOLOGY

## 2023-08-01 PROCEDURE — 10002807 HB RX 258

## 2023-08-01 PROCEDURE — 13000003 HB ANESTHESIA  GENERAL EA ADD MINUTE: Performed by: OBSTETRICS & GYNECOLOGY

## 2023-08-01 PROCEDURE — A58558 ANES HYSTEROSCOPY,BIOPSY: Performed by: ANESTHESIOLOGY

## 2023-08-01 RX ORDER — SODIUM CHLORIDE, SODIUM LACTATE, POTASSIUM CHLORIDE, CALCIUM CHLORIDE 600; 310; 30; 20 MG/100ML; MG/100ML; MG/100ML; MG/100ML
INJECTION, SOLUTION INTRAVENOUS CONTINUOUS PRN
Status: DISCONTINUED | OUTPATIENT
Start: 2023-08-01 | End: 2023-08-01

## 2023-08-01 RX ORDER — ONDANSETRON 2 MG/ML
4 INJECTION INTRAMUSCULAR; INTRAVENOUS 2 TIMES DAILY PRN
Status: DISCONTINUED | OUTPATIENT
Start: 2023-08-01 | End: 2023-08-01 | Stop reason: HOSPADM

## 2023-08-01 RX ORDER — ONDANSETRON 2 MG/ML
INJECTION INTRAMUSCULAR; INTRAVENOUS PRN
Status: DISCONTINUED | OUTPATIENT
Start: 2023-08-01 | End: 2023-08-01

## 2023-08-01 RX ORDER — HYDRALAZINE HYDROCHLORIDE 20 MG/ML
5 INJECTION INTRAMUSCULAR; INTRAVENOUS EVERY 10 MIN PRN
Status: DISCONTINUED | OUTPATIENT
Start: 2023-08-01 | End: 2023-08-01 | Stop reason: HOSPADM

## 2023-08-01 RX ORDER — ROCURONIUM BROMIDE 10 MG/ML
INJECTION, SOLUTION INTRAVENOUS PRN
Status: DISCONTINUED | OUTPATIENT
Start: 2023-08-01 | End: 2023-08-01

## 2023-08-01 RX ORDER — ESMOLOL HYDROCHLORIDE 10 MG/ML
INJECTION INTRAVENOUS PRN
Status: DISCONTINUED | OUTPATIENT
Start: 2023-08-01 | End: 2023-08-01

## 2023-08-01 RX ORDER — PROCHLORPERAZINE EDISYLATE 5 MG/ML
5 INJECTION INTRAMUSCULAR; INTRAVENOUS EVERY 4 HOURS PRN
Status: DISCONTINUED | OUTPATIENT
Start: 2023-08-01 | End: 2023-08-01 | Stop reason: HOSPADM

## 2023-08-01 RX ORDER — IPRATROPIUM BROMIDE AND ALBUTEROL SULFATE 2.5; .5 MG/3ML; MG/3ML
3 SOLUTION RESPIRATORY (INHALATION)
Status: DISCONTINUED | OUTPATIENT
Start: 2023-08-01 | End: 2023-08-01 | Stop reason: HOSPADM

## 2023-08-01 RX ORDER — PROPOFOL 10 MG/ML
INJECTION, EMULSION INTRAVENOUS PRN
Status: DISCONTINUED | OUTPATIENT
Start: 2023-08-01 | End: 2023-08-01

## 2023-08-01 RX ORDER — MIDAZOLAM HYDROCHLORIDE 1 MG/ML
INJECTION, SOLUTION INTRAMUSCULAR; INTRAVENOUS PRN
Status: DISCONTINUED | OUTPATIENT
Start: 2023-08-01 | End: 2023-08-01

## 2023-08-01 RX ADMIN — Medication 150 MG: at 12:56

## 2023-08-01 RX ADMIN — SODIUM CHLORIDE, POTASSIUM CHLORIDE, SODIUM LACTATE AND CALCIUM CHLORIDE: 600; 310; 30; 20 INJECTION, SOLUTION INTRAVENOUS at 12:47

## 2023-08-01 RX ADMIN — FENTANYL CITRATE 50 MCG: 50 INJECTION, SOLUTION INTRAMUSCULAR; INTRAVENOUS at 12:51

## 2023-08-01 RX ADMIN — ROCURONIUM BROMIDE 10 MG: 10 INJECTION, SOLUTION INTRAVENOUS at 13:09

## 2023-08-01 RX ADMIN — ROCURONIUM BROMIDE 20 MG: 10 INJECTION, SOLUTION INTRAVENOUS at 13:05

## 2023-08-01 RX ADMIN — PROPOFOL 180 MG: 10 INJECTION, EMULSION INTRAVENOUS at 12:55

## 2023-08-01 RX ADMIN — KETOROLAC TROMETHAMINE 30 MG: 30 INJECTION, SOLUTION INTRAMUSCULAR at 13:26

## 2023-08-01 RX ADMIN — MIDAZOLAM HYDROCHLORIDE 2 MG: 1 INJECTION, SOLUTION INTRAMUSCULAR; INTRAVENOUS at 12:47

## 2023-08-01 RX ADMIN — ONDANSETRON 4 MG: 2 INJECTION INTRAMUSCULAR; INTRAVENOUS at 13:25

## 2023-08-01 RX ADMIN — ESMOLOL HYDROCHLORIDE 50 MG: 100 INJECTION, SOLUTION INTRAVENOUS at 12:57

## 2023-08-01 SDOH — SOCIAL STABILITY: SOCIAL INSECURITY: RISK FACTORS: BMI> 30 (OBESITY)

## 2023-08-01 SDOH — SOCIAL STABILITY: SOCIAL INSECURITY: RISK FACTORS: AGE

## 2023-08-01 SDOH — SOCIAL STABILITY: SOCIAL INSECURITY: RISK FACTORS: HEART DISEASE

## 2023-08-01 ASSESSMENT — PAIN SCALES - GENERAL
PAINLEVEL_OUTOF10: 0

## 2023-08-01 ASSESSMENT — ENCOUNTER SYMPTOMS: EXERCISE TOLERANCE: GOOD (>4 METS)

## 2023-08-16 ENCOUNTER — CLINICAL DOCUMENTATION (OUTPATIENT)
Dept: HEMATOLOGY/ONCOLOGY | Age: 53
End: 2023-08-16

## 2023-09-13 ENCOUNTER — OFFICE VISIT (OUTPATIENT)
Dept: HEMATOLOGY/ONCOLOGY | Age: 53
End: 2023-09-13
Attending: OBSTETRICS & GYNECOLOGY

## 2023-09-13 ENCOUNTER — CLINICAL ABSTRACT (OUTPATIENT)
Dept: HEALTH INFORMATION MANAGEMENT | Facility: OTHER | Age: 53
End: 2023-09-13

## 2023-09-13 VITALS
DIASTOLIC BLOOD PRESSURE: 88 MMHG | WEIGHT: 293 LBS | HEIGHT: 63 IN | SYSTOLIC BLOOD PRESSURE: 132 MMHG | HEART RATE: 96 BPM | BODY MASS INDEX: 51.91 KG/M2 | TEMPERATURE: 98.2 F | OXYGEN SATURATION: 98 % | RESPIRATION RATE: 20 BRPM

## 2023-09-13 DIAGNOSIS — N83.202 CYST OF LEFT OVARY: ICD-10-CM

## 2023-09-13 DIAGNOSIS — E66.01 CLASS 3 SEVERE OBESITY DUE TO EXCESS CALORIES WITHOUT SERIOUS COMORBIDITY WITH BODY MASS INDEX (BMI) OF 50.0 TO 59.9 IN ADULT (CMD): ICD-10-CM

## 2023-09-13 DIAGNOSIS — N93.9 ABNORMAL UTERINE BLEEDING: ICD-10-CM

## 2023-09-13 DIAGNOSIS — N85.01 COMPLEX ENDOMETRIAL HYPERPLASIA: Primary | ICD-10-CM

## 2023-09-13 PROCEDURE — 88175 CYTOPATH C/V AUTO FLUID REDO: CPT | Performed by: OBSTETRICS & GYNECOLOGY

## 2023-09-13 PROCEDURE — 88305 TISSUE EXAM BY PATHOLOGIST: CPT | Performed by: OBSTETRICS & GYNECOLOGY

## 2023-09-13 PROCEDURE — 57500 BIOPSY OF CERVIX: CPT | Performed by: OBSTETRICS & GYNECOLOGY

## 2023-09-13 PROCEDURE — 99204 OFFICE O/P NEW MOD 45 MIN: CPT | Performed by: OBSTETRICS & GYNECOLOGY

## 2023-09-13 PROCEDURE — 57505 ENDOCERVICAL CURETTAGE: CPT | Performed by: OBSTETRICS & GYNECOLOGY

## 2023-09-13 PROCEDURE — 99211 OFF/OP EST MAY X REQ PHY/QHP: CPT | Performed by: OBSTETRICS & GYNECOLOGY

## 2023-09-13 PROCEDURE — 87624 HPV HI-RISK TYP POOLED RSLT: CPT | Performed by: OBSTETRICS & GYNECOLOGY

## 2023-09-13 RX ORDER — UBIDECARENONE 75 MG
CAPSULE ORAL
COMMUNITY
Start: 2023-07-31

## 2023-09-13 RX ORDER — MAGNESIUM CHLORIDE 64 MG
TABLET, DELAYED RELEASE (ENTERIC COATED) ORAL
COMMUNITY
Start: 2023-07-31

## 2023-09-13 ASSESSMENT — PATIENT HEALTH QUESTIONNAIRE - PHQ9
2. FEELING DOWN, DEPRESSED OR HOPELESS: NOT AT ALL
CLINICAL INTERPRETATION OF PHQ2 SCORE: NO FURTHER SCREENING NEEDED
SUM OF ALL RESPONSES TO PHQ9 QUESTIONS 1 AND 2: 0
SUM OF ALL RESPONSES TO PHQ9 QUESTIONS 1 AND 2: 0
1. LITTLE INTEREST OR PLEASURE IN DOING THINGS: NOT AT ALL

## 2023-09-13 ASSESSMENT — ENCOUNTER SYMPTOMS: CONSTIPATION: 1

## 2023-09-13 ASSESSMENT — PAIN SCALES - GENERAL: PAINLEVEL: 9

## 2023-09-18 LAB
ASR DISCLAIMER: NORMAL
CASE RPRT: NORMAL
CLINICAL INFO: NORMAL
PATH REPORT.FINAL DX SPEC: NORMAL
PATH REPORT.GROSS SPEC: NORMAL

## 2023-09-20 LAB
CASE RPRT: NORMAL
CLINICAL INFO: NORMAL
CYTOLOGY CVX/VAG STUDY: NORMAL
HPV16+18+45 E6+E7MRNA CVX NAA+PROBE: NEGATIVE
Lab: NORMAL
PAP EDUCATIONAL NOTE: NORMAL
SPECIMEN ADEQUACY: NORMAL

## 2023-09-21 ENCOUNTER — TELEPHONE (OUTPATIENT)
Dept: HEMATOLOGY/ONCOLOGY | Age: 53
End: 2023-09-21

## 2023-09-25 ENCOUNTER — TELEPHONE (OUTPATIENT)
Dept: HEMATOLOGY/ONCOLOGY | Age: 53
End: 2023-09-25

## 2023-09-26 ENCOUNTER — TELEPHONE (OUTPATIENT)
Dept: HEMATOLOGY/ONCOLOGY | Age: 53
End: 2023-09-26

## 2023-10-09 ENCOUNTER — APPOINTMENT (OUTPATIENT)
Dept: HEMATOLOGY/ONCOLOGY | Age: 53
End: 2023-10-09
Attending: OBSTETRICS & GYNECOLOGY

## 2023-10-09 ENCOUNTER — TELEPHONE (OUTPATIENT)
Dept: HEMATOLOGY/ONCOLOGY | Age: 53
End: 2023-10-09

## 2023-10-09 PROBLEM — N93.9 ABNORMAL UTERINE BLEEDING: Status: ACTIVE | Noted: 2023-10-09

## 2023-10-18 ENCOUNTER — OFFICE VISIT (OUTPATIENT)
Dept: HEMATOLOGY/ONCOLOGY | Age: 53
End: 2023-10-18
Attending: OBSTETRICS & GYNECOLOGY

## 2023-10-18 VITALS
OXYGEN SATURATION: 100 % | RESPIRATION RATE: 18 BRPM | HEART RATE: 93 BPM | DIASTOLIC BLOOD PRESSURE: 104 MMHG | BODY MASS INDEX: 53.54 KG/M2 | TEMPERATURE: 98.4 F | SYSTOLIC BLOOD PRESSURE: 164 MMHG | WEIGHT: 293 LBS

## 2023-10-18 DIAGNOSIS — E66.01 CLASS 3 SEVERE OBESITY DUE TO EXCESS CALORIES WITHOUT SERIOUS COMORBIDITY WITH BODY MASS INDEX (BMI) OF 50.0 TO 59.9 IN ADULT (CMD): ICD-10-CM

## 2023-10-18 DIAGNOSIS — N85.01 COMPLEX ENDOMETRIAL HYPERPLASIA: Primary | ICD-10-CM

## 2023-10-18 DIAGNOSIS — N83.202 CYST OF LEFT OVARY: ICD-10-CM

## 2023-10-18 PROCEDURE — 58300 INSERT INTRAUTERINE DEVICE: CPT | Performed by: OBSTETRICS & GYNECOLOGY

## 2023-10-18 PROCEDURE — 99215 OFFICE O/P EST HI 40 MIN: CPT | Performed by: OBSTETRICS & GYNECOLOGY

## 2023-10-18 ASSESSMENT — PATIENT HEALTH QUESTIONNAIRE - PHQ9
2. FEELING DOWN, DEPRESSED OR HOPELESS: NOT AT ALL
SUM OF ALL RESPONSES TO PHQ9 QUESTIONS 1 AND 2: 0
1. LITTLE INTEREST OR PLEASURE IN DOING THINGS: NOT AT ALL
SUM OF ALL RESPONSES TO PHQ9 QUESTIONS 1 AND 2: 0
CLINICAL INTERPRETATION OF PHQ2 SCORE: NO FURTHER SCREENING NEEDED

## 2023-10-18 ASSESSMENT — PAIN SCALES - GENERAL: PAINLEVEL: 0

## 2023-10-25 PROCEDURE — 10002800 HB RX 250 W HCPCS: Performed by: STUDENT IN AN ORGANIZED HEALTH CARE EDUCATION/TRAINING PROGRAM

## 2023-10-25 RX ADMIN — LEVONORGESTREL 52 MG: 52 INTRAUTERINE DEVICE INTRAUTERINE at 22:42

## 2023-10-31 DIAGNOSIS — M16.12 UNILATERAL PRIMARY OSTEOARTHRITIS, LEFT HIP: Primary | ICD-10-CM

## 2023-11-12 ENCOUNTER — APPOINTMENT (OUTPATIENT)
Dept: MRI IMAGING | Age: 53
End: 2023-11-12
Attending: INTERNAL MEDICINE

## 2023-11-15 ENCOUNTER — HOSPITAL ENCOUNTER (OUTPATIENT)
Dept: ULTRASOUND IMAGING | Age: 53
Discharge: HOME | End: 2023-11-15
Attending: OBSTETRICS & GYNECOLOGY

## 2023-11-15 DIAGNOSIS — N85.01 COMPLEX ENDOMETRIAL HYPERPLASIA: ICD-10-CM

## 2023-11-15 DIAGNOSIS — N83.202 CYST OF LEFT OVARY: ICD-10-CM

## 2023-11-15 PROCEDURE — 76830 TRANSVAGINAL US NON-OB: CPT

## 2023-11-22 ENCOUNTER — TELEPHONE (OUTPATIENT)
Dept: HEMATOLOGY/ONCOLOGY | Age: 53
End: 2023-11-22

## 2023-11-26 ENCOUNTER — APPOINTMENT (OUTPATIENT)
Dept: MRI IMAGING | Age: 53
End: 2023-11-26
Attending: INTERNAL MEDICINE

## 2023-11-28 ENCOUNTER — TELEPHONE (OUTPATIENT)
Dept: HEMATOLOGY/ONCOLOGY | Age: 53
End: 2023-11-28

## 2023-12-06 ENCOUNTER — APPOINTMENT (OUTPATIENT)
Dept: MRI IMAGING | Age: 53
End: 2023-12-06
Attending: INTERNAL MEDICINE

## 2023-12-06 ENCOUNTER — OFFICE VISIT (OUTPATIENT)
Dept: HEMATOLOGY/ONCOLOGY | Age: 53
End: 2023-12-06
Attending: OBSTETRICS & GYNECOLOGY

## 2023-12-06 VITALS
HEART RATE: 92 BPM | TEMPERATURE: 98.2 F | BODY MASS INDEX: 53.27 KG/M2 | DIASTOLIC BLOOD PRESSURE: 114 MMHG | WEIGHT: 293 LBS | SYSTOLIC BLOOD PRESSURE: 156 MMHG | RESPIRATION RATE: 18 BRPM | OXYGEN SATURATION: 97 %

## 2023-12-06 DIAGNOSIS — N83.202 CYST OF LEFT OVARY: ICD-10-CM

## 2023-12-06 DIAGNOSIS — N85.01 COMPLEX ENDOMETRIAL HYPERPLASIA: Primary | ICD-10-CM

## 2023-12-06 DIAGNOSIS — E66.01 CLASS 3 SEVERE OBESITY DUE TO EXCESS CALORIES WITHOUT SERIOUS COMORBIDITY WITH BODY MASS INDEX (BMI) OF 50.0 TO 59.9 IN ADULT (CMD): ICD-10-CM

## 2023-12-06 DIAGNOSIS — T83.32XA MALPOSITIONED INTRAUTERINE DEVICE (IUD), INITIAL ENCOUNTER: ICD-10-CM

## 2023-12-06 PROBLEM — E66.813 CLASS 3 SEVERE OBESITY DUE TO EXCESS CALORIES WITHOUT SERIOUS COMORBIDITY WITH BODY MASS INDEX (BMI) OF 50.0 TO 59.9 IN ADULT (CMD): Status: ACTIVE | Noted: 2023-12-06

## 2023-12-06 LAB — HCG UR QL: NEGATIVE

## 2023-12-06 PROCEDURE — 88305 TISSUE EXAM BY PATHOLOGIST: CPT | Performed by: ADVANCED PRACTICE MIDWIFE

## 2023-12-06 PROCEDURE — 99214 OFFICE O/P EST MOD 30 MIN: CPT | Performed by: ADVANCED PRACTICE MIDWIFE

## 2023-12-06 PROCEDURE — 84703 CHORIONIC GONADOTROPIN ASSAY: CPT | Performed by: ADVANCED PRACTICE MIDWIFE

## 2023-12-06 PROCEDURE — 99211 OFF/OP EST MAY X REQ PHY/QHP: CPT

## 2023-12-06 PROCEDURE — 58100 BIOPSY OF UTERUS LINING: CPT | Performed by: ADVANCED PRACTICE MIDWIFE

## 2023-12-06 ASSESSMENT — PATIENT HEALTH QUESTIONNAIRE - PHQ9
SUM OF ALL RESPONSES TO PHQ9 QUESTIONS 1 AND 2: 0
1. LITTLE INTEREST OR PLEASURE IN DOING THINGS: NOT AT ALL
2. FEELING DOWN, DEPRESSED OR HOPELESS: NOT AT ALL
CLINICAL INTERPRETATION OF PHQ2 SCORE: NO FURTHER SCREENING NEEDED
SUM OF ALL RESPONSES TO PHQ9 QUESTIONS 1 AND 2: 0

## 2023-12-06 ASSESSMENT — PAIN SCALES - GENERAL: PAINLEVEL: 0

## 2023-12-14 ENCOUNTER — EXTERNAL RECORD (OUTPATIENT)
Dept: OTHER | Age: 53
End: 2023-12-14

## 2023-12-28 ENCOUNTER — HOSPITAL ENCOUNTER (OUTPATIENT)
Dept: ULTRASOUND IMAGING | Age: 53
Discharge: HOME | End: 2023-12-28
Attending: ADVANCED PRACTICE MIDWIFE

## 2023-12-28 DIAGNOSIS — N85.01 COMPLEX ENDOMETRIAL HYPERPLASIA: ICD-10-CM

## 2023-12-28 DIAGNOSIS — T83.32XA MALPOSITIONED INTRAUTERINE DEVICE (IUD), INITIAL ENCOUNTER: ICD-10-CM

## 2023-12-28 PROCEDURE — 76856 US EXAM PELVIC COMPLETE: CPT

## 2024-01-05 ENCOUNTER — CLINICAL DOCUMENTATION (OUTPATIENT)
Dept: HEMATOLOGY/ONCOLOGY | Age: 54
End: 2024-01-05

## 2024-01-05 DIAGNOSIS — T83.32XA MALPOSITIONED INTRAUTERINE DEVICE (IUD), INITIAL ENCOUNTER: ICD-10-CM

## 2024-01-05 DIAGNOSIS — N85.01 COMPLEX ENDOMETRIAL HYPERPLASIA: Primary | ICD-10-CM

## 2024-01-15 ENCOUNTER — TELEPHONE (OUTPATIENT)
Dept: HEMATOLOGY/ONCOLOGY | Age: 54
End: 2024-01-15

## 2024-01-15 ENCOUNTER — APPOINTMENT (OUTPATIENT)
Dept: HEMATOLOGY/ONCOLOGY | Age: 54
End: 2024-01-15
Attending: OBSTETRICS & GYNECOLOGY

## 2024-01-24 ENCOUNTER — TELEPHONE (OUTPATIENT)
Dept: HEMATOLOGY/ONCOLOGY | Age: 54
End: 2024-01-24

## 2024-02-07 ENCOUNTER — APPOINTMENT (OUTPATIENT)
Dept: MRI IMAGING | Age: 54
End: 2024-02-07
Attending: INTERNAL MEDICINE

## 2024-02-14 ENCOUNTER — OFFICE VISIT (OUTPATIENT)
Dept: HEMATOLOGY/ONCOLOGY | Age: 54
End: 2024-02-14
Attending: OBSTETRICS & GYNECOLOGY

## 2024-02-14 VITALS
TEMPERATURE: 98.3 F | HEIGHT: 63 IN | HEART RATE: 87 BPM | WEIGHT: 293 LBS | RESPIRATION RATE: 18 BRPM | BODY MASS INDEX: 51.91 KG/M2 | OXYGEN SATURATION: 97 % | DIASTOLIC BLOOD PRESSURE: 93 MMHG | SYSTOLIC BLOOD PRESSURE: 155 MMHG

## 2024-02-14 DIAGNOSIS — S30.814A ABRASION OF VAGINA, INITIAL ENCOUNTER: Primary | ICD-10-CM

## 2024-02-14 DIAGNOSIS — T83.32XA MALPOSITIONED INTRAUTERINE DEVICE (IUD), INITIAL ENCOUNTER: ICD-10-CM

## 2024-02-14 DIAGNOSIS — N85.01 COMPLEX ENDOMETRIAL HYPERPLASIA: ICD-10-CM

## 2024-02-14 PROCEDURE — 58100 BIOPSY OF UTERUS LINING: CPT | Performed by: OBSTETRICS & GYNECOLOGY

## 2024-02-14 PROCEDURE — 58301 REMOVE INTRAUTERINE DEVICE: CPT | Performed by: OBSTETRICS & GYNECOLOGY

## 2024-02-14 PROCEDURE — 10002800 HB RX 250 W HCPCS: Performed by: STUDENT IN AN ORGANIZED HEALTH CARE EDUCATION/TRAINING PROGRAM

## 2024-02-14 PROCEDURE — 88300 SURGICAL PATH GROSS: CPT | Performed by: OBSTETRICS & GYNECOLOGY

## 2024-02-14 PROCEDURE — 87529 HSV DNA AMP PROBE: CPT | Performed by: OBSTETRICS & GYNECOLOGY

## 2024-02-14 PROCEDURE — 58300 INSERT INTRAUTERINE DEVICE: CPT | Performed by: OBSTETRICS & GYNECOLOGY

## 2024-02-14 RX ADMIN — LEVONORGESTREL 52 MG: 52 INTRAUTERINE DEVICE INTRAUTERINE at 15:20

## 2024-02-14 ASSESSMENT — PAIN SCALES - GENERAL: PAINLEVEL: 0

## 2024-02-15 LAB
HSV1 DNA SPEC QL NAA+PROBE: NOT DETECTED
HSV2 DNA SPEC QL NAA+PROBE: NOT DETECTED
SERVICE CMNT-IMP: NORMAL

## 2024-02-29 PROBLEM — Z30.431 SURVEILLANCE OF PREVIOUSLY PRESCRIBED INTRAUTERINE CONTRACEPTIVE DEVICE: Status: ACTIVE | Noted: 2024-02-29

## 2024-03-06 DIAGNOSIS — M25.552 PAIN IN LEFT HIP: Primary | ICD-10-CM

## 2024-03-08 ENCOUNTER — OFFICE VISIT (OUTPATIENT)
Dept: HEMATOLOGY/ONCOLOGY | Age: 54
End: 2024-03-08
Attending: OBSTETRICS & GYNECOLOGY

## 2024-03-08 VITALS
TEMPERATURE: 97.3 F | HEART RATE: 103 BPM | SYSTOLIC BLOOD PRESSURE: 139 MMHG | RESPIRATION RATE: 18 BRPM | WEIGHT: 293 LBS | DIASTOLIC BLOOD PRESSURE: 89 MMHG | BODY MASS INDEX: 53.35 KG/M2 | OXYGEN SATURATION: 96 %

## 2024-03-08 DIAGNOSIS — Z30.431 SURVEILLANCE OF PREVIOUSLY PRESCRIBED INTRAUTERINE CONTRACEPTIVE DEVICE: ICD-10-CM

## 2024-03-08 DIAGNOSIS — T83.32XA MALPOSITIONED INTRAUTERINE DEVICE (IUD), INITIAL ENCOUNTER: ICD-10-CM

## 2024-03-08 DIAGNOSIS — N85.01 COMPLEX ENDOMETRIAL HYPERPLASIA: Primary | ICD-10-CM

## 2024-03-08 PROCEDURE — 99211 OFF/OP EST MAY X REQ PHY/QHP: CPT

## 2024-03-08 ASSESSMENT — PATIENT HEALTH QUESTIONNAIRE - PHQ9
2. FEELING DOWN, DEPRESSED OR HOPELESS: NOT AT ALL
SUM OF ALL RESPONSES TO PHQ9 QUESTIONS 1 AND 2: 0
SUM OF ALL RESPONSES TO PHQ9 QUESTIONS 1 AND 2: 0
1. LITTLE INTEREST OR PLEASURE IN DOING THINGS: NOT AT ALL
CLINICAL INTERPRETATION OF PHQ2 SCORE: NO FURTHER SCREENING NEEDED

## 2024-03-08 ASSESSMENT — PAIN SCALES - GENERAL: PAINLEVEL: 0

## 2024-04-16 ENCOUNTER — APPOINTMENT (OUTPATIENT)
Dept: ULTRASOUND IMAGING | Age: 54
End: 2024-04-16
Attending: ADVANCED PRACTICE MIDWIFE

## 2024-05-09 ENCOUNTER — APPOINTMENT (OUTPATIENT)
Dept: ULTRASOUND IMAGING | Age: 54
End: 2024-05-09
Attending: ADVANCED PRACTICE MIDWIFE

## 2024-05-15 DIAGNOSIS — M25.552 PAIN IN LEFT HIP: Primary | ICD-10-CM

## 2024-06-03 ENCOUNTER — HOSPITAL ENCOUNTER (OUTPATIENT)
Dept: ULTRASOUND IMAGING | Age: 54
Discharge: HOME | End: 2024-06-03
Attending: ADVANCED PRACTICE MIDWIFE

## 2024-06-03 DIAGNOSIS — N85.01 COMPLEX ENDOMETRIAL HYPERPLASIA: ICD-10-CM

## 2024-06-03 DIAGNOSIS — T83.32XA MALPOSITIONED INTRAUTERINE DEVICE (IUD), INITIAL ENCOUNTER: ICD-10-CM

## 2024-06-03 DIAGNOSIS — Z30.431 SURVEILLANCE OF PREVIOUSLY PRESCRIBED INTRAUTERINE CONTRACEPTIVE DEVICE: ICD-10-CM

## 2024-06-03 PROCEDURE — 76856 US EXAM PELVIC COMPLETE: CPT

## 2024-06-07 ENCOUNTER — OFFICE VISIT (OUTPATIENT)
Dept: HEMATOLOGY/ONCOLOGY | Age: 54
End: 2024-06-07
Attending: OBSTETRICS & GYNECOLOGY

## 2024-06-07 VITALS
BODY MASS INDEX: 51.91 KG/M2 | HEIGHT: 63 IN | HEART RATE: 101 BPM | RESPIRATION RATE: 17 BRPM | TEMPERATURE: 98.7 F | SYSTOLIC BLOOD PRESSURE: 131 MMHG | WEIGHT: 293 LBS | DIASTOLIC BLOOD PRESSURE: 86 MMHG | OXYGEN SATURATION: 96 %

## 2024-06-07 DIAGNOSIS — N85.01 COMPLEX ENDOMETRIAL HYPERPLASIA: Primary | ICD-10-CM

## 2024-06-07 DIAGNOSIS — Z30.431 SURVEILLANCE OF PREVIOUSLY PRESCRIBED INTRAUTERINE CONTRACEPTIVE DEVICE: ICD-10-CM

## 2024-06-07 LAB — HCG UR QL: NEGATIVE

## 2024-06-07 PROCEDURE — 58100 BIOPSY OF UTERUS LINING: CPT | Performed by: ADVANCED PRACTICE MIDWIFE

## 2024-06-07 PROCEDURE — 84703 CHORIONIC GONADOTROPIN ASSAY: CPT | Performed by: ADVANCED PRACTICE MIDWIFE

## 2024-06-07 ASSESSMENT — PAIN SCALES - GENERAL: PAINLEVEL: 0

## 2024-09-10 ENCOUNTER — TELEPHONE (OUTPATIENT)
Dept: HEMATOLOGY/ONCOLOGY | Age: 54
End: 2024-09-10

## 2024-09-20 ENCOUNTER — APPOINTMENT (OUTPATIENT)
Dept: HEMATOLOGY/ONCOLOGY | Age: 54
End: 2024-09-20
Attending: OBSTETRICS & GYNECOLOGY

## 2024-10-04 ENCOUNTER — APPOINTMENT (OUTPATIENT)
Dept: HEMATOLOGY/ONCOLOGY | Age: 54
End: 2024-10-04
Attending: OBSTETRICS & GYNECOLOGY

## 2024-12-11 ENCOUNTER — TELEPHONE (OUTPATIENT)
Dept: HEMATOLOGY/ONCOLOGY | Age: 54
End: 2024-12-11

## 2025-01-23 ENCOUNTER — TELEPHONE (OUTPATIENT)
Dept: HEMATOLOGY/ONCOLOGY | Age: 55
End: 2025-01-23

## 2025-01-30 ENCOUNTER — HOSPITAL ENCOUNTER (OUTPATIENT)
Dept: MRI IMAGING | Age: 55
Discharge: HOME | End: 2025-01-30
Attending: INTERNAL MEDICINE

## 2025-01-30 DIAGNOSIS — M25.552 PAIN IN LEFT HIP: ICD-10-CM

## 2025-01-30 PROCEDURE — 73721 MRI JNT OF LWR EXTRE W/O DYE: CPT

## 2025-02-06 ENCOUNTER — OFFICE VISIT (OUTPATIENT)
Dept: HEMATOLOGY/ONCOLOGY | Age: 55
End: 2025-02-06
Attending: ADVANCED PRACTICE MIDWIFE

## 2025-02-06 VITALS
OXYGEN SATURATION: 97 % | TEMPERATURE: 98.1 F | DIASTOLIC BLOOD PRESSURE: 87 MMHG | HEIGHT: 63 IN | SYSTOLIC BLOOD PRESSURE: 134 MMHG | WEIGHT: 293 LBS | BODY MASS INDEX: 51.91 KG/M2 | RESPIRATION RATE: 19 BRPM | HEART RATE: 92 BPM

## 2025-02-06 DIAGNOSIS — N95.1 HOT FLUSHES, PERIMENOPAUSAL: ICD-10-CM

## 2025-02-06 DIAGNOSIS — Z30.431 SURVEILLANCE OF PREVIOUSLY PRESCRIBED INTRAUTERINE CONTRACEPTIVE DEVICE: ICD-10-CM

## 2025-02-06 DIAGNOSIS — N85.01 COMPLEX ENDOMETRIAL HYPERPLASIA: Primary | ICD-10-CM

## 2025-02-06 DIAGNOSIS — L29.2 PRURITUS, VULVA: ICD-10-CM

## 2025-02-06 PROBLEM — N89.8 VAGINAL DRYNESS: Status: ACTIVE | Noted: 2025-02-06

## 2025-02-06 LAB — HCG UR QL: NEGATIVE

## 2025-02-06 PROCEDURE — 99215 OFFICE O/P EST HI 40 MIN: CPT | Performed by: ADVANCED PRACTICE MIDWIFE

## 2025-02-06 PROCEDURE — 58100 BIOPSY OF UTERUS LINING: CPT | Performed by: ADVANCED PRACTICE MIDWIFE

## 2025-02-06 PROCEDURE — 84703 CHORIONIC GONADOTROPIN ASSAY: CPT | Performed by: ADVANCED PRACTICE MIDWIFE

## 2025-02-06 ASSESSMENT — PATIENT HEALTH QUESTIONNAIRE - PHQ9
SUM OF ALL RESPONSES TO PHQ9 QUESTIONS 1 AND 2: 2
1. LITTLE INTEREST OR PLEASURE IN DOING THINGS: SEVERAL DAYS
CLINICAL INTERPRETATION OF PHQ2 SCORE: NO FURTHER SCREENING NEEDED
2. FEELING DOWN, DEPRESSED OR HOPELESS: SEVERAL DAYS
SUM OF ALL RESPONSES TO PHQ9 QUESTIONS 1 AND 2: 2

## 2025-02-06 ASSESSMENT — PAIN SCALES - GENERAL: PAINLEVEL: 0

## 2025-05-07 ENCOUNTER — TELEPHONE (OUTPATIENT)
Dept: HEMATOLOGY/ONCOLOGY | Age: 55
End: 2025-05-07

## 2025-05-09 ENCOUNTER — APPOINTMENT (OUTPATIENT)
Dept: HEMATOLOGY/ONCOLOGY | Age: 55
End: 2025-05-09
Attending: ADVANCED PRACTICE MIDWIFE

## 2025-05-09 DIAGNOSIS — N85.01 COMPLEX ENDOMETRIAL HYPERPLASIA: Primary | ICD-10-CM

## 2025-05-09 DIAGNOSIS — N95.1 HOT FLUSHES, PERIMENOPAUSAL: ICD-10-CM

## 2025-05-09 DIAGNOSIS — Z30.431 SURVEILLANCE OF PREVIOUSLY PRESCRIBED INTRAUTERINE CONTRACEPTIVE DEVICE: ICD-10-CM

## (undated) DEVICE — KIT FLUID MGMT BAG TISS TRAP FLUENT FLOPAK DISP

## (undated) DEVICE — GLOVE SURG 8 PROTEXIS LF BLUE PF SMTH BEAD CUFF INTLK STRL

## (undated) DEVICE — GLOVE SURG 6.5 PROTEXIS LF BLUE PF SMTH BEAD CUFF INTLK STRL

## (undated) DEVICE — GLOVE SURG 6.5 PROTEXIS PI MIC LF CRM PF SMTH BEAD CUFF STRL

## (undated) DEVICE — Device

## (undated) DEVICE — DEVICE TISS REM 25.25IN 3MM MYOSURE HNDHLD 17OZ STRL DISP

## (undated) DEVICE — GLOVE SURG 7.5 PROTEXIS PI MIC LF CRM PF SMTH BEAD CUFF STRL